# Patient Record
Sex: FEMALE | Race: BLACK OR AFRICAN AMERICAN | NOT HISPANIC OR LATINO | Employment: UNEMPLOYED | URBAN - METROPOLITAN AREA
[De-identification: names, ages, dates, MRNs, and addresses within clinical notes are randomized per-mention and may not be internally consistent; named-entity substitution may affect disease eponyms.]

---

## 2017-04-17 DIAGNOSIS — O09.522 ELDERLY MULTIGRAVIDA IN SECOND TRIMESTER: Primary | ICD-10-CM

## 2017-04-19 ENCOUNTER — OFFICE VISIT (OUTPATIENT)
Dept: MATERNAL FETAL MEDICINE | Facility: CLINIC | Age: 37
End: 2017-04-19
Payer: MEDICAID

## 2017-04-19 VITALS
WEIGHT: 158.94 LBS | SYSTOLIC BLOOD PRESSURE: 112 MMHG | DIASTOLIC BLOOD PRESSURE: 72 MMHG | BODY MASS INDEX: 31.04 KG/M2

## 2017-04-19 DIAGNOSIS — O09.522 ELDERLY MULTIGRAVIDA IN SECOND TRIMESTER: ICD-10-CM

## 2017-04-19 DIAGNOSIS — F11.90: ICD-10-CM

## 2017-04-19 DIAGNOSIS — Z36.89 ENCOUNTER FOR ULTRASOUND TO CHECK FETAL GROWTH: ICD-10-CM

## 2017-04-19 DIAGNOSIS — O99.322: ICD-10-CM

## 2017-04-19 PROCEDURE — 99999 PR PBB SHADOW E&M-EST. PATIENT-LVL II: CPT | Mod: PBBFAC,,, | Performed by: PEDIATRICS

## 2017-04-19 PROCEDURE — 76811 OB US DETAILED SNGL FETUS: CPT | Mod: PBBFAC | Performed by: PEDIATRICS

## 2017-04-19 PROCEDURE — 99204 OFFICE O/P NEW MOD 45 MIN: CPT | Mod: S$PBB,25,TH, | Performed by: PEDIATRICS

## 2017-04-19 PROCEDURE — 76811 OB US DETAILED SNGL FETUS: CPT | Mod: 26,S$PBB,, | Performed by: PEDIATRICS

## 2017-04-19 PROCEDURE — 99212 OFFICE O/P EST SF 10 MIN: CPT | Mod: PBBFAC,25 | Performed by: PEDIATRICS

## 2017-04-19 RX ORDER — ONDANSETRON 4 MG/1
4 TABLET, FILM COATED ORAL EVERY 6 HOURS PRN
COMMUNITY
End: 2017-06-10

## 2017-04-19 NOTE — LETTER
April 21, 2017      Andrea Love MD  [address] [facility] [address]           [facility]  [address] [facility] [address]  Phone: [phone]          Patient: Kelley Ortega   MR Number: [id number]   YOB: 1980   Date of Visit: 4/19/2017       Dear Dr. Andrea Love:    Thank you for referring Kelley Ortega to me for evaluation. Attached you will find relevant portions of my assessment and plan of care.    If you have questions, please do not hesitate to call me. I look forward to following Kelley Ortega along with you.    Sincerely,    Yakelin Martinez MD    Enclosure  CC:  No Recipients    If you would like to receive this communication electronically, please contact [email or web address] or [phone] to request more information on [employer] access.    For providers and/or their staff who would like to refer a patient to [facility], please contact us through our one-stop-shop provider referral line, [facility], at [phone].    If you feel you have received this communication in error or would no longer like to receive these types of communications, please e-mail [email or web address]

## 2017-04-21 PROBLEM — O99.322: Status: ACTIVE | Noted: 2017-04-21

## 2017-04-21 PROBLEM — F11.90: Status: ACTIVE | Noted: 2017-04-21

## 2017-04-21 NOTE — PROGRESS NOTES
"Indication  ========    consult: anatomy/ AMA .    History  ======    General History  Other: Penta Screen Negative, DSR 1: 3702  Previous Outcomes   6  Para 3  Green children born living (T) 3  Green children born (T) 3  Abortions (A) 2  Green living children (L) 3  Terminations 1  Ectopic 1    Maternal Assessment  =================    Weight 72 kg  Weight (lb) 159 lb  BP syst 112 mmHg  BP diast 72 mmHg    Method  ======    Transabdominal ultrasound examination. View: Sufficient.    Pregnancy  =========    Green pregnancy. Number of fetuses: 1.    Dating  ======    GA by "stated dating" 23 w + 5 d  ROSALIO by "stated dating": 2017  Ultrasound examination on: 2017  GA by U/S based upon: AC, BPD, Femur, HC  GA by U/S 24 w + 3 d  ROSALIO by U/S: 2017  Assigned: Dating performed on 2017, based on the external assessment  Assigned GA 23 w + 5 d  Assigned ROSALIO: 2017          General Evaluation  ==============    Cardiac activity: present.  bpm.  Fetal movements: visualized.  Presentation: cephalic .  Placenta: anterior.  Umbilical cord: 3 vessel cord.  Amniotic fluid: normal amount.        Fetal Biometry  ============    Fetal Biometry  BPD 59.1 mm 61% 24w 1d Hadlock  OFD 78.3 mm 95% 25w 4d Linwood  .3 mm 51% 24w 1d Hadlock  .6 mm 59% 24w 2d Hadlock  Femur 45.7 mm 81% 25w 1d Hadlock  Cerebellum tr 27.3 mm 89% 25w 3d Vinson  CM 5.0 mm 24% Nicolaides  Nuchal fold 4.17 mm  Humerus 44.1 mm 97% 26w 1d Linwood   g 79% 24w 2d Hadlock  Calculated by: Hadlock (BPD-HC-AC-FL)  EFW (lb) 1 lb  EFW (oz) 9 oz  Cephalic index 0.75 19% Nicolaides  HC / AC 1.13  FL / BPD 0.77  FL / AC 0.23   bpm  Head / Face / Neck   3.8 mm        Fetal Anatomy  ============    Cranium: normal  Lateral ventricles: normal  Choroid plexus: normal  Midline falx: normal  Cavum septi pellucidi: normal  Cerebellum: normal  Cisterna " magna: normal  Lips: normal  Profile: normal  Nose: normal  RVOT: normal  LVOT: normal  4-chamber view: 4-chamber normal, septum normal  Situs: normal  Aortic arch: normal  Ductal arch: normal  SVC: normal  IVC: normal  3-vessel view: normal  Diaphragm: normal  Cord insertion: normal  Stomach: normal  Kidneys: normal  Bladder: normal  Genitals: normal  Cervical spine: suboptimal  Thoracic spine: suboptimal  Lumbar spine: suboptimal  Sacral spine: suboptimal  Arms: normal  Legs: normal  Gender: female  Wants to know gender: yes  Other: both hands partially open        Maternal Structures  ===============    Uterus / Cervix  Cervical length 36.7 mm        Consultation  ==========    Type: Chief complaint: Advanced maternal age in pregnancy    Provider requesting consultation: Dr. Love    Age based risk for Down Syndrome at this gestational age is approximately 1 in 180.    Aneuploidy screening this pregnancy estimates the risk of Down Syndrome to be +1 in 3702.    A detailed fetal anatomical ultrasound was completed today. See official report in the imaging section of [facility]. Ultrasound noted no obvious  fetal abnormalities. Ultrasound findings consistent with established dating.    After today's ultrasound assessment that noted no evidence for aneuploidy I quoted the patient a Down Syndrome risk of less than 1 in 5852.    Advanced maternal age counseling and recommendations:    Today I counseled the patient on the relationship between maternal age and genetic aneuploidy. We discussed the risks and benefits of  screening tests versus definitive genetic testing (amniocentesis). She was counseled about her specific age related risk of Down Syndrome.  We discussed the limitations of ultrasound in the definitive diagnosis of Down Syndrome and we discussed amniocentesis as providing  definitive diagnosis. I quoted the patient a 1 in 1000 procedure related risk of fetal loss with genetic amniocenetesis. I also discussed with  the  patient the option of non-invasive prenatal testing (NIPT) (ex. Materni T21) including the sensitivity and specificity of the test. Her questions  were answered. As above, she had screen negative Penta Screen and declines further testing.    Recommendations from our MFM group at [facility]:  -For women who are of advanced maternal age at the time of delivery we recommend a follow up fetal ultrasound at 32-34 weeks for interval  fetal growth and well being (biophysical profile).    It should be noted that this very nice patient has had difficulty with pain management from a carol ann placement in her left hip. She has been  obtaining pain meds on the street. This is far from optimal. She should be referred to a pain management service. She may need chronic  Methadone or Subutex.      Impression  =========    Fetal anatomy could not be optimally visualized due to fetal position. No abnormalities are seen.    Biometry is consistent with dating.    AFV is normal.      I spent 30 minutes in direct consultation and care management.      Recommendation  ==============    1. Growth and completion of anatomy in 3 - 4 weeks. Serial growth.  2. Recommend referral to pain management.  3. Initiation of Methadone or Subutex to eliminate street drug purchase.    Thank you for allowing us to participate in the care of your patients. If you have any questions concerning today's consultation feel free to  contact me or one of my partners. We can be reached at [phone] during normal business hours. If you have a question after normal  business hours, please contact Labor and Delivery [phone] and the unit secretary will page our on call physician.

## 2017-05-17 ENCOUNTER — OFFICE VISIT (OUTPATIENT)
Dept: MATERNAL FETAL MEDICINE | Facility: CLINIC | Age: 37
End: 2017-05-17
Payer: MEDICAID

## 2017-05-17 DIAGNOSIS — Z36.89 ENCOUNTER FOR ULTRASOUND TO CHECK FETAL GROWTH: ICD-10-CM

## 2017-05-17 DIAGNOSIS — O09.522 ELDERLY MULTIGRAVIDA IN SECOND TRIMESTER: ICD-10-CM

## 2017-05-17 PROCEDURE — 99499 UNLISTED E&M SERVICE: CPT | Mod: S$PBB,,, | Performed by: PEDIATRICS

## 2017-05-17 PROCEDURE — 76816 OB US FOLLOW-UP PER FETUS: CPT | Mod: PBBFAC | Performed by: PEDIATRICS

## 2017-05-17 PROCEDURE — 76816 OB US FOLLOW-UP PER FETUS: CPT | Mod: 26,S$PBB,, | Performed by: PEDIATRICS

## 2017-05-17 NOTE — PROGRESS NOTES
"Indication  ========    Evaluation of fetal growth.    History  ======    General History  Other: Penta Screen Negative, DSR 1: 3702  Previous Outcomes   6  Para 3  Green children born living (T) 3  Green children born (T) 3  Abortions (A) 2  Green living children (L) 3  Terminations 1  Ectopic 1    Method  ======    Transabdominal ultrasound examination.    Pregnancy  =========    Green pregnancy. Number of fetuses: 1.          Dating  ======    Cycle: regular cycle  GA by "stated dating" 27 w + 5 d  ROSALIO by "stated dating": 2017  Ultrasound examination on: 2017  GA by U/S based upon: AC, BPD, Femur, HC  GA by U/S 29 w + 5 d  ROSALIO by U/S: 2017  Assigned: Dating performed on 2017, based on the external assessment  Assigned GA 27 w + 5 d  Assigned ROSALIO: 2017        General Evaluation  ==============    Cardiac activity: present.  bpm.  Fetal movements: visualized.  Placenta: anterior, fundal.  Umbilical cord: 3 vessel cord.  Amniotic fluid: MVP 7.0 cm.          Fetal Biometry  ============    Fetal Biometry  BPD 73.8 mm 29w 4d Hadlock  OFD 96.4 mm 31w 1d Linwood  .3 mm 29w 6d Hadlock  .7 mm 29w 0d Hadlock  Femur 58.8 mm 30w 5d Hadlock  Cerebellum tr 34.8 mm 30w 4d Vinson  CM 6.6 mm  EFW 1,436 g 96% 29w 2d Hadlock  Calculated by: Hadlock (BPD-HC-AC-FL)  EFW (lb) 3 lb  EFW (oz) 3 oz  Cephalic index 0.77  HC / AC 1.10  FL / BPD 0.80  FL / AC 0.24  MVP 7.0 cm   bpm  Head / Face / Neck   2.1 mm        Fetal Anatomy  ============    Cranium: normal  Lateral ventricles: normal  Choroid plexus: normal  Midline falx: normal  Cavum septi pellucidi: normal  Cerebellum: normal  Cisterna magna: normal  Lips: normal  Profile: normal  Nose: normal  4-chamber view: normal  Cord insertion: documented previously  Stomach: normal  Kidneys: normal  Bladder: normal  Genitals: normal  Cervical spine: normal  Thoracic spine: normal  Lumbar spine: normal  Sacral " spine: normal  Legs: both visualized  Rt arm: documented previously  Lt arm: documented previously  Rt hand: documented previously  Lt hand: visualized  Lt hand: open  Rt leg: documented previously  Lt leg: documented previously  Gender: female  Wants to know gender: yes          Impression  =========    Limited anatomy was negative. No anomalies seen. This completes the anatomic survey.    AFV normal.  Fetal biometry is consistent and concordant with dating.            Recommendation  ==============    Return in 4 - 6 weeks for growth (AMA).    Thank you again for allowing us to participate in the care of your patients. If you have any questions concerning today's consultation, feel free  to contact me or one of my partners. We can be reached at [phone] during normal business hours. If you have a question after normal  business hours, please contact Labor and Delivery at [phone].

## 2017-05-22 ENCOUNTER — TELEPHONE (OUTPATIENT)
Dept: MATERNAL FETAL MEDICINE | Facility: CLINIC | Age: 37
End: 2017-05-22

## 2017-05-22 NOTE — TELEPHONE ENCOUNTER
Copy of ultrasound report faxed to Dr. Love's office accordingly.    ----- Message from Chelsey Varela sent at 5/22/2017  2:27 PM CDT -----  Contact: Michael Girard office  Era's office called looking for the report on pt Kelley Ortega MRN [id number] from her appt on May 17, 2017. Report can be faxed to [phone].

## 2017-06-06 ENCOUNTER — HOSPITAL ENCOUNTER (EMERGENCY)
Facility: OTHER | Age: 37
Discharge: HOME | End: 2017-06-07
Attending: EMERGENCY MEDICINE
Payer: MEDICAID

## 2017-06-06 VITALS
OXYGEN SATURATION: 96 % | TEMPERATURE: 98 F | BODY MASS INDEX: 31.55 KG/M2 | RESPIRATION RATE: 20 BRPM | DIASTOLIC BLOOD PRESSURE: 60 MMHG | WEIGHT: 160.69 LBS | HEIGHT: 60 IN | SYSTOLIC BLOOD PRESSURE: 107 MMHG | HEART RATE: 100 BPM

## 2017-06-06 DIAGNOSIS — H11.31 SUBCONJUNCTIVAL HEMORRHAGE, RIGHT: Primary | ICD-10-CM

## 2017-06-06 LAB
ALBUMIN SERPL BCP-MCNC: 2.9 G/DL
ALP SERPL-CCNC: 76 U/L
ALT SERPL W/O P-5'-P-CCNC: 9 U/L
AMPHET+METHAMPHET UR QL: NEGATIVE
ANION GAP SERPL CALC-SCNC: 7 MMOL/L
AST SERPL-CCNC: 15 U/L
B-HCG UR QL: POSITIVE
BARBITURATES UR QL SCN>200 NG/ML: NEGATIVE
BASOPHILS # BLD AUTO: 0.03 K/UL
BASOPHILS NFR BLD: 0.2 %
BENZODIAZ UR QL SCN>200 NG/ML: NORMAL
BILIRUB SERPL-MCNC: 0.2 MG/DL
BILIRUB UR QL STRIP: NEGATIVE
BUN SERPL-MCNC: 5 MG/DL
BZE UR QL SCN: NEGATIVE
CALCIUM SERPL-MCNC: 8.5 MG/DL
CANNABINOIDS UR QL SCN: NORMAL
CHLORIDE SERPL-SCNC: 105 MMOL/L
CLARITY UR: CLEAR
CO2 SERPL-SCNC: 22 MMOL/L
COLOR UR: YELLOW
CREAT SERPL-MCNC: 0.6 MG/DL
CREAT UR-MCNC: 131.9 MG/DL
CTP QC/QA: YES
DIFFERENTIAL METHOD: ABNORMAL
EOSINOPHIL # BLD AUTO: 0.1 K/UL
EOSINOPHIL NFR BLD: 0.8 %
ERYTHROCYTE [DISTWIDTH] IN BLOOD BY AUTOMATED COUNT: 15.3 %
EST. GFR  (AFRICAN AMERICAN): >60 ML/MIN/1.73 M^2
EST. GFR  (NON AFRICAN AMERICAN): >60 ML/MIN/1.73 M^2
GLUCOSE SERPL-MCNC: 88 MG/DL
GLUCOSE UR QL STRIP: NEGATIVE
HCT VFR BLD AUTO: 25 %
HGB BLD-MCNC: 8.6 G/DL
HGB UR QL STRIP: NEGATIVE
KETONES UR QL STRIP: NEGATIVE
LEUKOCYTE ESTERASE UR QL STRIP: ABNORMAL
LYMPHOCYTES # BLD AUTO: 2.6 K/UL
LYMPHOCYTES NFR BLD: 19.4 %
MAGNESIUM SERPL-MCNC: 1.7 MG/DL
MCH RBC QN AUTO: 26.6 PG
MCHC RBC AUTO-ENTMCNC: 34.4 %
MCV RBC AUTO: 77 FL
METHADONE UR QL SCN>300 NG/ML: NEGATIVE
MICROSCOPIC COMMENT: NORMAL
MONOCYTES # BLD AUTO: 1.2 K/UL
MONOCYTES NFR BLD: 9.4 %
NEUTROPHILS # BLD AUTO: 9.1 K/UL
NEUTROPHILS NFR BLD: 69.4 %
NITRITE UR QL STRIP: NEGATIVE
OPIATES UR QL SCN: NEGATIVE
PCP UR QL SCN>25 NG/ML: NEGATIVE
PH UR STRIP: 6 [PH] (ref 5–8)
PLATELET # BLD AUTO: 239 K/UL
PMV BLD AUTO: 10.2 FL
POTASSIUM SERPL-SCNC: 3.7 MMOL/L
PROT SERPL-MCNC: 6.9 G/DL
PROT UR QL STRIP: NEGATIVE
RBC # BLD AUTO: 3.23 M/UL
SODIUM SERPL-SCNC: 134 MMOL/L
SP GR UR STRIP: 1.01 (ref 1–1.03)
SQUAMOUS #/AREA URNS HPF: 12 /HPF
TOXICOLOGY INFORMATION: NORMAL
URN SPEC COLLECT METH UR: ABNORMAL
UROBILINOGEN UR STRIP-ACNC: NEGATIVE EU/DL
WBC # BLD AUTO: 13.17 K/UL
WBC #/AREA URNS HPF: 3 /HPF (ref 0–5)

## 2017-06-06 PROCEDURE — 81025 URINE PREGNANCY TEST: CPT | Performed by: EMERGENCY MEDICINE

## 2017-06-06 PROCEDURE — 25000003 PHARM REV CODE 250: Performed by: EMERGENCY MEDICINE

## 2017-06-06 PROCEDURE — 85025 COMPLETE CBC W/AUTO DIFF WBC: CPT

## 2017-06-06 PROCEDURE — 81000 URINALYSIS NONAUTO W/SCOPE: CPT

## 2017-06-06 PROCEDURE — 99283 EMERGENCY DEPT VISIT LOW MDM: CPT | Mod: 25

## 2017-06-06 PROCEDURE — 80307 DRUG TEST PRSMV CHEM ANLYZR: CPT

## 2017-06-06 PROCEDURE — 80053 COMPREHEN METABOLIC PANEL: CPT

## 2017-06-06 PROCEDURE — 83735 ASSAY OF MAGNESIUM: CPT

## 2017-06-06 RX ORDER — PROPARACAINE HYDROCHLORIDE 5 MG/ML
1 SOLUTION/ DROPS OPHTHALMIC
Status: COMPLETED | OUTPATIENT
Start: 2017-06-06 | End: 2017-06-06

## 2017-06-06 RX ADMIN — PROPARACAINE HYDROCHLORIDE 1 DROP: 5 SOLUTION/ DROPS OPHTHALMIC at 10:06

## 2017-06-06 RX ADMIN — FLUORESCEIN SODIUM 1 STRIP: 1 STRIP OPHTHALMIC at 10:06

## 2017-06-07 NOTE — ED NOTES
"Pt presented to ED via POV with complaints of right eye redness stating "I noticed it around 2 today and it looks like it a blood vessel burst", on arrival pt presents calm and cooperative RR easy non labored, NAD. Reports being approx 28 weeks pregnant. Negative other complaints at this time, continuous blood pressure and pulse ox applied with tachycardia noted on monitor will all other vitals wnl's, pt denies any current drug or alcohol use, friend at bedside. Denies any blurred vision or seeing spots, AAOx4, side rails up x2, call light within reach, bed in lowest locked position, will continue to monitor for changes   "

## 2017-06-07 NOTE — ED PROVIDER NOTES
Encounter Date: 2017    SCRIBE #1 NOTE: I, Renettadaniel Peña, am scribing for, and in the presence of,  Dr. Fernando I have scribed the entire note.       History     Chief Complaint   Patient presents with    Eye Problem     since 1400 hrs today, denies trauma, R eye, blood vessel burst     Review of patient's allergies indicates:  No Known Allergies  Time seen by provider: 10:01 PM    This is a 36 y.o. female who presents with complaint of mild right eye irritation and redness. The patient states that a blood vessel in her right eye spontaneously burst this afternoon. She admits to slightly blurry vision in the right eye.   Pt is 28 wks gestation, denies any issues and has been following her for prenatal care.  She denies double vision, vaginal bleeding, abdominal cramping, nausea, vomiting, diarrhea, fever, or chills. She denies wearing contacts or glasses.  She denies any issues with EOM and denies any eye drainage.        The history is provided by the patient.     Past Medical History:   Diagnosis Date    Carpal tunnel syndrome      Past Surgical History:   Procedure Laterality Date    BONY PELVIS SURGERY       SECTION, CLASSIC      HIP SURGERY      TUBAL LIGATION       Family History   Problem Relation Age of Onset    Asthma Neg Hx      Social History   Substance Use Topics    Smoking status: Former Smoker     Quit date: 2015    Smokeless tobacco: Not on file    Alcohol use No     Review of Systems   Constitutional: Negative for chills and fever.   HENT: Negative for congestion, nosebleeds and sore throat.    Eyes: Positive for pain, redness and visual disturbance (Mild blurriness in right eye).   Respiratory: Negative for shortness of breath.    Cardiovascular: Negative for chest pain.   Gastrointestinal: Negative for abdominal pain and nausea.   Genitourinary: Negative for difficulty urinating, dysuria and vaginal bleeding.   Musculoskeletal: Negative for back pain and neck pain.   Skin:  Negative for rash.   Neurological: Negative for dizziness, weakness and numbness.   Hematological: Does not bruise/bleed easily.   Psychiatric/Behavioral: Negative for agitation and confusion.       Physical Exam     Initial Vitals [06/06/17 2109]   BP Pulse Resp Temp SpO2   105/69 (!) 143 20 98.3 °F (36.8 °C) 95 %     Physical Exam    Nursing note and vitals reviewed.  Constitutional: She appears well-developed and well-nourished. She is not diaphoretic. No distress.   HENT:   Head: Normocephalic and atraumatic.   Right Ear: External ear normal.   Left Ear: External ear normal.   Eyes: EOM are normal. Pupils are equal, round, and reactive to light. Right eye exhibits no discharge. Left eye exhibits no discharge.   Pupils 2 mm and reactive to light bilaterally.  EOM intact bilaterally with no pain.  R eye:  Subconjunctival hemorrhage in the medial aspect.  Intraocular pressure of 13. No corneal abrasions, ulcers, no increased fluorescein uptake, or evidence of epithelial defects. No periorbital erythema or edema. L eye: Normal.     Neck: Normal range of motion. Neck supple.   Cardiovascular: Regular rhythm, normal heart sounds and intact distal pulses.   Tachycardia   Pulmonary/Chest: Breath sounds normal. No respiratory distress. She has no wheezes. She has no rhonchi. She has no rales.   Abdominal: Soft. Bowel sounds are normal. She exhibits no distension. There is no tenderness. There is no rebound and no guarding.   Gravid abdomen.   Musculoskeletal: Normal range of motion. She exhibits no edema or tenderness.   Neurological: She is alert and oriented to person, place, and time. She has normal strength. No sensory deficit.   Skin: Skin is warm and dry. No rash noted.   Psychiatric: She has a normal mood and affect. Her behavior is normal. Judgment and thought content normal.         ED Course   Procedures  Labs Reviewed   URINALYSIS - Abnormal; Notable for the following:        Result Value    Leukocytes, UA 1+  (*)     All other components within normal limits   CBC W/ AUTO DIFFERENTIAL - Abnormal; Notable for the following:     WBC 13.17 (*)     RBC 3.23 (*)     Hemoglobin 8.6 (*)     Hematocrit 25.0 (*)     MCV 77 (*)     MCH 26.6 (*)     RDW 15.3 (*)     Gran # 9.1 (*)     Mono # 1.2 (*)     All other components within normal limits   COMPREHENSIVE METABOLIC PANEL - Abnormal; Notable for the following:     Sodium 134 (*)     CO2 22 (*)     BUN, Bld 5 (*)     Calcium 8.5 (*)     Albumin 2.9 (*)     ALT 9 (*)     Anion Gap 7 (*)     All other components within normal limits   POCT URINE PREGNANCY - Abnormal; Notable for the following:     POC Preg Test, Ur Positive (*)     All other components within normal limits   DRUG SCREEN PANEL, URINE EMERGENCY   MAGNESIUM   URINALYSIS MICROSCOPIC             Medical Decision Making:   History:   Old Medical Records: I decided to obtain old medical records.  Old Records Summarized: records from clinic visits and other records.  Initial Assessment:   10:01PM:  Pt is a 35 y/o F who presents to ED with R eye redness, appears to be a subconjunctival hemorrhage. Will plan for more extensive eye exam. She is also very tachycardic in the 140s, no clear etiology and pt denies any symptoms. Will plan for IVFs, UA, labs, will continue to follow and reassess.    Clinical Tests:   Lab Tests: Ordered and Reviewed    11:06 PM:  Pt doing well.  Her eye exam is benign with no concerning findings.  Will plan for supportive care measures for a subconjunctival hemorrhage.  Her HR did come down to 100 with just 1 L of IVFs.  Her UDS was positive for benzos and marijuana which could be contributing to her elevated HR.  Her labs are otherwise WNL though she is slightly anemic.  I updated pt regarding results including need to f/u with her OB regarding her anemia.   She had good FHTs.  I counseled pt regarding supportive care measures.  I have discussed with the pt ED return warnings and need for close  PCP f/u.  Pt agreeable to plan and all questions answered.  I feel that pt is stable for discharge and management as an outpatient and no further intervention is needed at this time.  Pt is comfortable returning to the ED if needed.  Will DC home in stable condition.                Scribe Attestation:   Scribe #1: I performed the above scribed service and the documentation accurately describes the services I performed. I attest to the accuracy of the note.    Attending Attestation:           Physician Attestation for Scribe:  Physician Attestation Statement for Scribe #1: I, Dr. Sandoval, reviewed documentation, as scribed by Renetta Peña in my presence, and it is both accurate and complete.                 ED Course     Clinical Impression:     1. Subconjunctival hemorrhage, right                Neisha Sandoval MD  06/06/17 5513

## 2017-06-10 ENCOUNTER — HOSPITAL ENCOUNTER (EMERGENCY)
Facility: OTHER | Age: 37
Discharge: HOME | End: 2017-06-10
Payer: MEDICAID

## 2017-06-10 VITALS
HEART RATE: 107 BPM | TEMPERATURE: 98 F | OXYGEN SATURATION: 99 % | SYSTOLIC BLOOD PRESSURE: 123 MMHG | RESPIRATION RATE: 20 BRPM | DIASTOLIC BLOOD PRESSURE: 64 MMHG

## 2017-06-10 LAB
AMPHET+METHAMPHET UR QL: NEGATIVE
BARBITURATES UR QL SCN>200 NG/ML: NEGATIVE
BENZODIAZ UR QL SCN>200 NG/ML: NORMAL
BILIRUB SERPL-MCNC: NEGATIVE MG/DL
BLOOD URINE, POC: NEGATIVE
BZE UR QL SCN: NEGATIVE
CANDIDA RRNA VAG QL PROBE: POSITIVE
CANNABINOIDS UR QL SCN: NORMAL
COLOR, POC UA: NORMAL
CREAT UR-MCNC: 105.1 MG/DL
ETHANOL UR-MCNC: <10 MG/DL
G VAGINALIS RRNA GENITAL QL PROBE: POSITIVE
GLUCOSE UR QL STRIP: NEGATIVE
KETONES UR QL STRIP: NEGATIVE
LEUKOCYTE ESTERASE URINE, POC: 1
METHADONE UR QL SCN>300 NG/ML: NEGATIVE
NITRITE, POC UA: NEGATIVE
OPIATES UR QL SCN: NEGATIVE
PCP UR QL SCN>25 NG/ML: NEGATIVE
PH, POC UA: NORMAL
PROTEIN, POC: NEGATIVE
SPECIFIC GRAVITY, POC UA: NORMAL
T VAGINALIS RRNA GENITAL QL PROBE: NEGATIVE
TOXICOLOGY INFORMATION: NORMAL
UROBILINOGEN, POC UA: NEGATIVE

## 2017-06-10 PROCEDURE — 87086 URINE CULTURE/COLONY COUNT: CPT

## 2017-06-10 PROCEDURE — 80307 DRUG TEST PRSMV CHEM ANLYZR: CPT

## 2017-06-10 PROCEDURE — 81002 URINALYSIS NONAUTO W/O SCOPE: CPT

## 2017-06-10 PROCEDURE — 99284 EMERGENCY DEPT VISIT MOD MDM: CPT | Mod: 25

## 2017-06-10 PROCEDURE — 25000003 PHARM REV CODE 250: Performed by: OBSTETRICS & GYNECOLOGY

## 2017-06-10 PROCEDURE — 87480 CANDIDA DNA DIR PROBE: CPT

## 2017-06-10 PROCEDURE — 87591 N.GONORRHOEAE DNA AMP PROB: CPT

## 2017-06-10 RX ORDER — ONDANSETRON 4 MG/1
4 TABLET, FILM COATED ORAL EVERY 6 HOURS PRN
Qty: 30 TABLET | Refills: 0 | Status: ON HOLD | OUTPATIENT
Start: 2017-06-10 | End: 2017-08-08 | Stop reason: HOSPADM

## 2017-06-10 RX ORDER — OXYCODONE HYDROCHLORIDE 5 MG/1
10 TABLET ORAL ONCE
Status: COMPLETED | OUTPATIENT
Start: 2017-06-10 | End: 2017-06-10

## 2017-06-10 RX ORDER — ACETAMINOPHEN 500 MG
1000 TABLET ORAL ONCE
Status: COMPLETED | OUTPATIENT
Start: 2017-06-10 | End: 2017-06-10

## 2017-06-10 RX ADMIN — ACETAMINOPHEN 1000 MG: 500 TABLET ORAL at 05:06

## 2017-06-10 RX ADMIN — OXYCODONE HYDROCHLORIDE 10 MG: 5 TABLET ORAL at 05:06

## 2017-06-10 NOTE — ED NOTES
Pt discharged home @ 1831. AVS reviewed with patient. Discharge instructions and education given. Pt verbalised understanding.
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
## 2017-06-10 NOTE — DISCHARGE INSTRUCTIONS
Call clinic [phone] or L & D after hours at [phone] for vaginal bleeding, leakage of fluids, regular contractions every 5 mins for 2 hours, decreased fetal movements ( 10 kicks in 2 hours), headache not relieved by Tylenol, blurry vision, or temp of 100.4 or greater.  Begin doing fetal kick counts, at least 10 movements in 2 hours starting at 28 weeks gestation.  Keep next clinic appointment

## 2017-06-11 LAB
C TRACH DNA SPEC QL NAA+PROBE: NOT DETECTED
N GONORRHOEA DNA SPEC QL NAA+PROBE: NOT DETECTED

## 2017-06-12 LAB — BACTERIA UR CULT: NORMAL

## 2017-06-13 ENCOUNTER — TELEPHONE (OUTPATIENT)
Dept: OBSTETRICS AND GYNECOLOGY | Facility: CLINIC | Age: 37
End: 2017-06-13

## 2017-06-13 RX ORDER — METRONIDAZOLE 7.5 MG/G
1 GEL VAGINAL DAILY
Qty: 7 APPLICATOR | Refills: 0 | Status: SHIPPED | OUTPATIENT
Start: 2017-06-13 | End: 2017-06-20

## 2017-06-13 RX ORDER — FLUCONAZOLE 150 MG/1
150 TABLET ORAL ONCE
Qty: 2 TABLET | Refills: 1 | Status: SHIPPED | OUTPATIENT
Start: 2017-06-13 | End: 2017-06-13

## 2017-06-13 NOTE — TELEPHONE ENCOUNTER
Contacted patient. She was seen in the ED this weekend. Affirm positive for BV and yeast. Discussed dx and treatment plan.   Patient confirms that she has been taking her Zofran prior to taking her Macrobid and is now able to keep down Abx for UTI.   Has appt with MFM tomorrow. Encouraged patient to discuss opiate use with MFM provider at tomorrow's visit.

## 2017-06-14 ENCOUNTER — SOCIAL WORK (OUTPATIENT)
Dept: CASE MANAGEMENT | Facility: OTHER | Age: 37
End: 2017-06-14
Payer: MEDICAID

## 2017-06-14 ENCOUNTER — OFFICE VISIT (OUTPATIENT)
Dept: MATERNAL FETAL MEDICINE | Facility: CLINIC | Age: 37
End: 2017-06-14
Payer: MEDICAID

## 2017-06-14 DIAGNOSIS — O09.523 ELDERLY MULTIGRAVIDA IN THIRD TRIMESTER: ICD-10-CM

## 2017-06-14 DIAGNOSIS — Z36.89 ENCOUNTER FOR ULTRASOUND TO CHECK FETAL GROWTH: ICD-10-CM

## 2017-06-14 PROCEDURE — 99499 UNLISTED E&M SERVICE: CPT | Mod: S$PBB,,, | Performed by: OBSTETRICS & GYNECOLOGY

## 2017-06-14 PROCEDURE — 76816 OB US FOLLOW-UP PER FETUS: CPT | Mod: PBBFAC | Performed by: OBSTETRICS & GYNECOLOGY

## 2017-06-14 PROCEDURE — 76816 OB US FOLLOW-UP PER FETUS: CPT | Mod: 26,S$PBB,, | Performed by: OBSTETRICS & GYNECOLOGY

## 2017-06-14 NOTE — LETTER
June 14, 2017      Yakelin Martinez MD  [address]  [facility] [address]           [facility]  [address]  [facility] [address]  Phone: [phone]          Patient: Kelley Ortega   MR Number: [id number]   YOB: 1980   Date of Visit: 6/14/2017       Dear Dr. Yakelin Martinez:    Thank you for referring Kelley Ortega to me for evaluation. Attached you will find relevant portions of my assessment and plan of care.    If you have questions, please do not hesitate to call me. I look forward to following Kelley Ortega along with you.    Sincerely,    Meaghan Browne MD    Enclosure  CC:  No Recipients    If you would like to receive this communication electronically, please contact [email or web address] or [phone] to request more information on [employer] Link access.    For providers and/or their staff who would like to refer a patient to [facility], please contact us through our one-stop-shop provider referral line, [facility], at [phone].    If you feel you have received this communication in error or would no longer like to receive these types of communications, please e-mail [email or web address]

## 2017-06-14 NOTE — PROGRESS NOTES
"OB Ultrasound:    Indication  ========    Follow-up evaluation for fetal growth; AMA.    History  ======    General History  Other: Penta Screen Negative, DSR 1: 3702  Previous Outcomes   6  Para 3  Green children born living (T) 3  Green children born (T) 3  Abortions (A) 2  Green living children (L) 3  Terminations 1  Ectopic 1    Method  ======    Transabdominal ultrasound examination. View: Sufficient.    Pregnancy  =========    Green pregnancy. Number of fetuses: 1.    Dating  ======    Cycle: regular cycle  GA by "stated dating" 31 w + 5 d  ROSALIO by "stated dating": 2017  Ultrasound examination on: 2017  GA by U/S based upon: AC, BPD, Femur, HC  GA by U/S 32 w + 5 d  ROSALIO by U/S: 2017  Assigned: Dating performed on 2017, based on the external assessment  Assigned GA 31 w + 5 d  Assigned ROSALIO: 2017    General Evaluation  ===============    Cardiac activity: present.  bpm.  Fetal movements: visualized.  Presentation: cephalic.  Placenta: anterior.  Umbilical cord: 3 vessel cord.  Amniotic fluid: MVP 7.8 cm.    Fetal Biometry  ===========    Fetal Biometry  BPD 80.9 mm 32w 3d Hadlock  .7 mm 34w 2d Linwood  .3 mm 32w 6d Hadlock  .1 mm 33w 0d Hadlock  Femur 63.2 mm 32w 5d Hadlock  EFW 2,066 g 46% Bossman  Calculated by: Hadlock (BPD-HC-AC-FL)  EFW (lb) 4 lb  EFW (oz) 9 oz  Cephalic index 0.77  HC / AC 1.02  FL / BPD 0.78  FL / AC 0.22  MVP 7.8 cm   bpm    Fetal Anatomy  ===========    Cranium: normal  Stomach: normal  Kidneys: normal  Bladder: normal  Gender: female  Wants to know gender: yes  Other: A full anatomic survey has been previously performed.    Impression  =========    Fetal size is AGA with the EFW at the 46th percentile.  Normal repeat limited fetal anatomic survey. AFV is normal. Follow-up ultrasound as clinically indicated.  The patient was seen by Social Work today regarding her chronic oral opiate use and stated desire " to discontinue use of these  substances. She was provided with information regarding services available to assist in withdrawal from use of these medications.

## 2017-06-14 NOTE — NURSING
"Pt presents to [facility] clinic for follow-up growth ultrasound. Pt requesting for assistance with her "addiction to pain medicine." Dr. Browne spoke with patient and Ashley () contacted and came to clinic for consult with patient.      "

## 2017-06-15 ENCOUNTER — TELEPHONE (OUTPATIENT)
Dept: OBSTETRICS AND GYNECOLOGY | Facility: CLINIC | Age: 37
End: 2017-06-15

## 2017-06-15 RX ORDER — METRONIDAZOLE 500 MG/1
500 TABLET ORAL 2 TIMES DAILY
Qty: 14 TABLET | Refills: 0 | Status: SHIPPED | OUTPATIENT
Start: 2017-06-15 | End: 2017-06-22

## 2017-06-15 NOTE — TELEPHONE ENCOUNTER
Pt was calling you back to speak with you. She said she is having problems and her insurance won't cover the rx that was sent in so she would like to speak with you and see if there is something else she can do.

## 2017-06-15 NOTE — TELEPHONE ENCOUNTER
Spoke to pt regarding rx, I told the pt that her rx was sent to the pharmacy. Also told her to take the nausea medication 20 mins before taking the flagyl. Pt understood. She also c/o some sharp abdominal pain. She ststaed that if it continued that she would go to the ER. I told her to keep us posted.

## 2017-06-16 NOTE — PROGRESS NOTES
"Met with pt after called by the [facility] clinic for pt admitting addiction to pain pills and wanting resources to help stop.    Pt has been using percocet and soma throughout pregnancy. Pt tried to stop using pain pills but could not tolerate the withdrawal. Pt reported she discussed with her primary OBGYN and she was told to "just stop." Pt family members tell her the same thing. Pt is having a hard time continuing to get the pain pills daily to stay out of withdrawal. Pt educated on methadone, subutex or suboxone being the opiate replacement therapy rec'd for pregnant women. Pt reported she cannot afford those meds. Pt educated on the [facility] clinic(contact info and address provided) and the reduction in cost for pregnant women. Pt encouraged to reach out to family members bc she would need financial support since she is not working. Pt also educated on mandated reporting for  drug exposure once baby is born. Pt was very tearful throughout visit. Emotional support was provided. Pt strongly encouraged to show up at [facility] tomorrow morning in between 5 am -8am for intake. Pt educated on medicaid transportation since she does not have consistent transportation.     Pt asked for [facility] MD to rx at least one day of pain pills to avoid withdrawal. MD was not comfortable with this plan. Sw is in agreement with MD. If pt goes for intake tomorrow morning, she will be given opiate replacement meds that morning which will stop or prevent withdrawal. Pt is aware of this and verbalized understanding.    Ashley Painter LCSW    [facility]  [email or web address]    (phone) [phone] or  Txq. [phone]  (fax) [phone]    "

## 2017-07-19 ENCOUNTER — HOSPITAL ENCOUNTER (OUTPATIENT)
Facility: HOSPITAL | Age: 37
Discharge: HOME | End: 2017-07-19
Attending: OBSTETRICS & GYNECOLOGY | Admitting: OBSTETRICS & GYNECOLOGY
Payer: MEDICAID

## 2017-07-19 VITALS
SYSTOLIC BLOOD PRESSURE: 114 MMHG | BODY MASS INDEX: 31.8 KG/M2 | RESPIRATION RATE: 18 BRPM | DIASTOLIC BLOOD PRESSURE: 71 MMHG | TEMPERATURE: 98 F | HEART RATE: 80 BPM | HEIGHT: 60 IN | WEIGHT: 162 LBS

## 2017-07-19 DIAGNOSIS — Z34.90 PREGNANCY WITH ONE FETUS: ICD-10-CM

## 2017-07-19 PROCEDURE — 25000003 PHARM REV CODE 250: Performed by: OBSTETRICS & GYNECOLOGY

## 2017-07-19 PROCEDURE — 99211 OFF/OP EST MAY X REQ PHY/QHP: CPT

## 2017-07-19 PROCEDURE — 96360 HYDRATION IV INFUSION INIT: CPT

## 2017-07-19 RX ORDER — SODIUM CHLORIDE, SODIUM LACTATE, POTASSIUM CHLORIDE, CALCIUM CHLORIDE 600; 310; 30; 20 MG/100ML; MG/100ML; MG/100ML; MG/100ML
INJECTION, SOLUTION INTRAVENOUS CONTINUOUS
Status: DISCONTINUED | OUTPATIENT
Start: 2017-07-19 | End: 2017-07-19 | Stop reason: HOSPADM

## 2017-07-19 RX ADMIN — SODIUM CHLORIDE, SODIUM LACTATE, POTASSIUM CHLORIDE, AND CALCIUM CHLORIDE 1000 ML: .6; .31; .03; .02 INJECTION, SOLUTION INTRAVENOUS at 12:07
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
## 2017-07-19 NOTE — PROGRESS NOTES
Discharge instructions reviewed with pt, included kick counts & labor precautions. Pt verbalized understanding. Ambulated off unit with guards..

## 2017-07-19 NOTE — DISCHARGE INSTRUCTIONS
Home Undelivered Discharge Instructions    After Discharge Orders:    No future appointments.        Current Discharge Medication List      CONTINUE these medications which have NOT CHANGED    Details   ondansetron (ZOFRAN) 4 MG tablet Take 1 tablet (4 mg total) by mouth every 6 (six) hours as needed for Nausea.  Qty: 30 tablet, Refills: 0      PRENATAL VIT CALC,IRON,FOLIC (PRENATAL VITAMIN ORAL) Take 1 tablet by mouth Daily.                     · Diet:  normal diet as tolerated    · Rest: normal activity as tolerated    Other instructions: Do kick counts once a day on your baby. Choose the time of day your baby is most active. Get in a comfortable lying or sitting position and time how long it takes to feel 10 kicks, twists, turns, swishes, or rolls. Call your physician or midwife if there have not been 10 kicks in 1.5 hours    Call physician or midwife, return to Labor and Delivery, call 911, or go to the nearest Emergency Room if: decreased fetal movement, persistent low back pain or cramping, bleeding from vaginal area, difficulty urinating or pain with urination     DRINK 8-10 BOTTLES OF WATER A DAY.  FOLLOW UP IN  OFFICE WITH DR BRADSHAW.

## 2017-07-19 NOTE — PROGRESS NOTES
Report to Dr Love, received order to discharge with instructions to follow up in office after she gets out of [facility].

## 2017-08-06 ENCOUNTER — ANESTHESIA (OUTPATIENT)
Dept: OBSTETRICS AND GYNECOLOGY | Facility: HOSPITAL | Age: 37
End: 2017-08-06
Payer: MEDICAID

## 2017-08-06 ENCOUNTER — ANESTHESIA EVENT (OUTPATIENT)
Dept: OBSTETRICS AND GYNECOLOGY | Facility: HOSPITAL | Age: 37
End: 2017-08-06
Payer: MEDICAID

## 2017-08-06 ENCOUNTER — HOSPITAL ENCOUNTER (INPATIENT)
Facility: HOSPITAL | Age: 37
LOS: 2 days | Discharge: HOME | End: 2017-08-08
Attending: OBSTETRICS & GYNECOLOGY | Admitting: OBSTETRICS & GYNECOLOGY
Payer: MEDICAID

## 2017-08-06 DIAGNOSIS — G89.18 POST-OP PAIN: Primary | ICD-10-CM

## 2017-08-06 DIAGNOSIS — Z34.90 PREGNANT: ICD-10-CM

## 2017-08-06 PROBLEM — O09.522 ELDERLY MULTIGRAVIDA IN SECOND TRIMESTER: Status: RESOLVED | Noted: 2017-05-17 | Resolved: 2017-08-06

## 2017-08-06 PROBLEM — O34.219 DECLINES VAGINAL BIRTH AFTER CESAREAN TRIAL: Status: RESOLVED | Noted: 2017-08-06 | Resolved: 2017-08-06

## 2017-08-06 PROBLEM — O34.219 DECLINES VAGINAL BIRTH AFTER CESAREAN TRIAL: Status: ACTIVE | Noted: 2017-08-06

## 2017-08-06 LAB
ABO + RH BLD: NORMAL
AMPHET+METHAMPHET UR QL: NEGATIVE
BARBITURATES UR QL SCN>200 NG/ML: NEGATIVE
BASOPHILS # BLD AUTO: 0.03 K/UL
BASOPHILS NFR BLD: 0.3 %
BENZODIAZ UR QL SCN>200 NG/ML: NEGATIVE
BLD GP AB SCN CELLS X3 SERPL QL: NORMAL
BZE UR QL SCN: NEGATIVE
CANNABINOIDS UR QL SCN: NEGATIVE
CREAT UR-MCNC: 45.5 MG/DL
DIFFERENTIAL METHOD: ABNORMAL
EOSINOPHIL # BLD AUTO: 0.1 K/UL
EOSINOPHIL NFR BLD: 0.5 %
ERYTHROCYTE [DISTWIDTH] IN BLOOD BY AUTOMATED COUNT: 16.8 %
HCT VFR BLD AUTO: 28.5 %
HGB BLD-MCNC: 9.3 G/DL
HIV1+2 IGG SERPL QL IA.RAPID: NEGATIVE
LYMPHOCYTES # BLD AUTO: 2.1 K/UL
LYMPHOCYTES NFR BLD: 17.9 %
MCH RBC QN AUTO: 23.7 PG
MCHC RBC AUTO-ENTMCNC: 32.6 G/DL
MCV RBC AUTO: 73 FL
METHADONE UR QL SCN>300 NG/ML: NEGATIVE
MONOCYTES # BLD AUTO: 1.3 K/UL
MONOCYTES NFR BLD: 11.1 %
NEUTROPHILS # BLD AUTO: 8.2 K/UL
NEUTROPHILS NFR BLD: 70.2 %
OPIATES UR QL SCN: NEGATIVE
PCP UR QL SCN>25 NG/ML: NEGATIVE
PLATELET # BLD AUTO: 205 K/UL
PMV BLD AUTO: 11.6 FL
RBC # BLD AUTO: 3.92 M/UL
TOXICOLOGY INFORMATION: NORMAL
WBC # BLD AUTO: 11.79 K/UL

## 2017-08-06 PROCEDURE — 86900 BLOOD TYPING SEROLOGIC ABO: CPT

## 2017-08-06 PROCEDURE — 11000001 HC ACUTE MED/SURG PRIVATE ROOM

## 2017-08-06 PROCEDURE — 25000003 PHARM REV CODE 250: Performed by: OBSTETRICS & GYNECOLOGY

## 2017-08-06 PROCEDURE — 36415 COLL VENOUS BLD VENIPUNCTURE: CPT

## 2017-08-06 PROCEDURE — 51702 INSERT TEMP BLADDER CATH: CPT

## 2017-08-06 PROCEDURE — 80307 DRUG TEST PRSMV CHEM ANLYZR: CPT

## 2017-08-06 PROCEDURE — 87340 HEPATITIS B SURFACE AG IA: CPT

## 2017-08-06 PROCEDURE — 63600175 PHARM REV CODE 636 W HCPCS: Performed by: OBSTETRICS & GYNECOLOGY

## 2017-08-06 PROCEDURE — 88302 TISSUE EXAM BY PATHOLOGIST: CPT | Performed by: PATHOLOGY

## 2017-08-06 PROCEDURE — 27200688 HC TRAY, SPINAL-HYPER/ ISOBARIC: Performed by: ANESTHESIOLOGY

## 2017-08-06 PROCEDURE — 37000008 HC ANESTHESIA 1ST 15 MINUTES: Performed by: OBSTETRICS & GYNECOLOGY

## 2017-08-06 PROCEDURE — 99211 OFF/OP EST MAY X REQ PHY/QHP: CPT

## 2017-08-06 PROCEDURE — 37000009 HC ANESTHESIA EA ADD 15 MINS: Performed by: OBSTETRICS & GYNECOLOGY

## 2017-08-06 PROCEDURE — 86762 RUBELLA ANTIBODY: CPT

## 2017-08-06 PROCEDURE — 86850 RBC ANTIBODY SCREEN: CPT

## 2017-08-06 PROCEDURE — 25000003 PHARM REV CODE 250: Performed by: NURSE ANESTHETIST, CERTIFIED REGISTERED

## 2017-08-06 PROCEDURE — 59514 CESAREAN DELIVERY ONLY: CPT | Mod: CRNA,,, | Performed by: NURSE ANESTHETIST, CERTIFIED REGISTERED

## 2017-08-06 PROCEDURE — 27100025 HC TUBING, SET FLUID WARMER: Performed by: NURSE ANESTHETIST, CERTIFIED REGISTERED

## 2017-08-06 PROCEDURE — 88302 TISSUE EXAM BY PATHOLOGIST: CPT | Mod: 26,,, | Performed by: PATHOLOGY

## 2017-08-06 PROCEDURE — 36000685 HC CESAREAN SECTION LEVEL I

## 2017-08-06 PROCEDURE — 86592 SYPHILIS TEST NON-TREP QUAL: CPT

## 2017-08-06 PROCEDURE — 86703 HIV-1/HIV-2 1 RESULT ANTBDY: CPT

## 2017-08-06 PROCEDURE — 63600175 PHARM REV CODE 636 W HCPCS: Performed by: ANESTHESIOLOGY

## 2017-08-06 PROCEDURE — 85025 COMPLETE CBC W/AUTO DIFF WBC: CPT

## 2017-08-06 PROCEDURE — 36000680 HC C/S TUBAL LIGATION LEVEL I

## 2017-08-06 PROCEDURE — 63600175 PHARM REV CODE 636 W HCPCS: Performed by: NURSE ANESTHETIST, CERTIFIED REGISTERED

## 2017-08-06 PROCEDURE — 0UB70ZZ EXCISION OF BILATERAL FALLOPIAN TUBES, OPEN APPROACH: ICD-10-PCS | Performed by: OBSTETRICS & GYNECOLOGY

## 2017-08-06 PROCEDURE — S0028 INJECTION, FAMOTIDINE, 20 MG: HCPCS | Performed by: OBSTETRICS & GYNECOLOGY

## 2017-08-06 PROCEDURE — 59514 CESAREAN DELIVERY ONLY: CPT | Mod: ANES,,, | Performed by: ANESTHESIOLOGY

## 2017-08-06 RX ORDER — BUPIVACAINE HYDROCHLORIDE 7.5 MG/ML
INJECTION, SOLUTION INTRASPINAL
Status: DISCONTINUED | OUTPATIENT
Start: 2017-08-06 | End: 2017-08-06

## 2017-08-06 RX ORDER — METOCLOPRAMIDE 10 MG/1
10 TABLET ORAL EVERY 6 HOURS PRN
Status: DISCONTINUED | OUTPATIENT
Start: 2017-08-06 | End: 2017-08-06

## 2017-08-06 RX ORDER — ONDANSETRON 8 MG/1
8 TABLET, ORALLY DISINTEGRATING ORAL EVERY 8 HOURS PRN
Status: DISCONTINUED | OUTPATIENT
Start: 2017-08-06 | End: 2017-08-08 | Stop reason: HOSPADM

## 2017-08-06 RX ORDER — SODIUM CITRATE AND CITRIC ACID MONOHYDRATE 334; 500 MG/5ML; MG/5ML
30 SOLUTION ORAL
Status: DISCONTINUED | OUTPATIENT
Start: 2017-08-06 | End: 2017-08-06

## 2017-08-06 RX ORDER — SODIUM CITRATE AND CITRIC ACID MONOHYDRATE 334; 500 MG/5ML; MG/5ML
30 SOLUTION ORAL ONCE
Status: DISCONTINUED | OUTPATIENT
Start: 2017-08-06 | End: 2017-08-06

## 2017-08-06 RX ORDER — FENTANYL CITRATE 50 UG/ML
INJECTION, SOLUTION INTRAMUSCULAR; INTRAVENOUS
Status: DISCONTINUED | OUTPATIENT
Start: 2017-08-06 | End: 2017-08-06

## 2017-08-06 RX ORDER — DIPHENHYDRAMINE HYDROCHLORIDE 50 MG/ML
12.5 INJECTION INTRAMUSCULAR; INTRAVENOUS EVERY 4 HOURS PRN
Status: DISCONTINUED | OUTPATIENT
Start: 2017-08-06 | End: 2017-08-06

## 2017-08-06 RX ORDER — NALOXONE HCL 0.4 MG/ML
0.02 VIAL (ML) INJECTION
Status: DISCONTINUED | OUTPATIENT
Start: 2017-08-06 | End: 2017-08-06

## 2017-08-06 RX ORDER — DIPHENHYDRAMINE HCL 25 MG
25 CAPSULE ORAL EVERY 4 HOURS PRN
Status: DISCONTINUED | OUTPATIENT
Start: 2017-08-06 | End: 2017-08-08 | Stop reason: HOSPADM

## 2017-08-06 RX ORDER — METOCLOPRAMIDE HYDROCHLORIDE 5 MG/ML
10 INJECTION INTRAMUSCULAR; INTRAVENOUS ONCE
Status: COMPLETED | OUTPATIENT
Start: 2017-08-06 | End: 2017-08-06

## 2017-08-06 RX ORDER — PHENYLEPHRINE HYDROCHLORIDE 10 MG/ML
INJECTION INTRAVENOUS
Status: DISCONTINUED | OUTPATIENT
Start: 2017-08-06 | End: 2017-08-06

## 2017-08-06 RX ORDER — DOCUSATE SODIUM 100 MG/1
200 CAPSULE, LIQUID FILLED ORAL 2 TIMES DAILY
Status: DISCONTINUED | OUTPATIENT
Start: 2017-08-06 | End: 2017-08-08 | Stop reason: HOSPADM

## 2017-08-06 RX ORDER — ADHESIVE BANDAGE
30 BANDAGE TOPICAL 2 TIMES DAILY PRN
Status: DISCONTINUED | OUTPATIENT
Start: 2017-08-07 | End: 2017-08-08 | Stop reason: HOSPADM

## 2017-08-06 RX ORDER — IBUPROFEN 600 MG/1
600 TABLET ORAL EVERY 6 HOURS
Status: DISCONTINUED | OUTPATIENT
Start: 2017-08-07 | End: 2017-08-08 | Stop reason: HOSPADM

## 2017-08-06 RX ORDER — OXYCODONE AND ACETAMINOPHEN 5; 325 MG/1; MG/1
1 TABLET ORAL EVERY 4 HOURS PRN
Status: DISCONTINUED | OUTPATIENT
Start: 2017-08-06 | End: 2017-08-08 | Stop reason: HOSPADM

## 2017-08-06 RX ORDER — SODIUM CHLORIDE, SODIUM LACTATE, POTASSIUM CHLORIDE, CALCIUM CHLORIDE 600; 310; 30; 20 MG/100ML; MG/100ML; MG/100ML; MG/100ML
INJECTION, SOLUTION INTRAVENOUS CONTINUOUS
Status: ACTIVE | OUTPATIENT
Start: 2017-08-06 | End: 2017-08-07

## 2017-08-06 RX ORDER — ACETAMINOPHEN 10 MG/ML
1000 INJECTION, SOLUTION INTRAVENOUS EVERY 8 HOURS
Status: COMPLETED | OUTPATIENT
Start: 2017-08-06 | End: 2017-08-07

## 2017-08-06 RX ORDER — SIMETHICONE 80 MG
1 TABLET,CHEWABLE ORAL EVERY 6 HOURS PRN
Status: DISCONTINUED | OUTPATIENT
Start: 2017-08-06 | End: 2017-08-08 | Stop reason: HOSPADM

## 2017-08-06 RX ORDER — NALOXONE HCL 0.4 MG/ML
0.02 VIAL (ML) INJECTION
Status: DISCONTINUED | OUTPATIENT
Start: 2017-08-06 | End: 2017-08-07

## 2017-08-06 RX ORDER — MISOPROSTOL 200 UG/1
800 TABLET ORAL
Status: DISCONTINUED | OUTPATIENT
Start: 2017-08-06 | End: 2017-08-06

## 2017-08-06 RX ORDER — CEFAZOLIN SODIUM 2 G/50ML
2 SOLUTION INTRAVENOUS ONCE
Status: COMPLETED | OUTPATIENT
Start: 2017-08-06 | End: 2017-08-06

## 2017-08-06 RX ORDER — BISACODYL 10 MG
10 SUPPOSITORY, RECTAL RECTAL ONCE AS NEEDED
Status: COMPLETED | OUTPATIENT
Start: 2017-08-07 | End: 2017-08-07

## 2017-08-06 RX ORDER — HYDROCORTISONE 25 MG/G
CREAM TOPICAL 3 TIMES DAILY PRN
Status: DISCONTINUED | OUTPATIENT
Start: 2017-08-06 | End: 2017-08-08 | Stop reason: HOSPADM

## 2017-08-06 RX ORDER — HYDROMORPHONE HCL IN 0.9% NACL 6 MG/30 ML
PATIENT CONTROLLED ANALGESIA SYRINGE INTRAVENOUS CONTINUOUS
Status: DISCONTINUED | OUTPATIENT
Start: 2017-08-06 | End: 2017-08-06

## 2017-08-06 RX ORDER — AMOXICILLIN 250 MG
1 CAPSULE ORAL NIGHTLY PRN
Status: DISCONTINUED | OUTPATIENT
Start: 2017-08-06 | End: 2017-08-08 | Stop reason: HOSPADM

## 2017-08-06 RX ORDER — IBUPROFEN 600 MG/1
600 TABLET ORAL EVERY 6 HOURS
Status: DISCONTINUED | OUTPATIENT
Start: 2017-08-06 | End: 2017-08-06

## 2017-08-06 RX ORDER — OXYTOCIN/RINGER'S LACTATE 20/1000 ML
41.65 PLASTIC BAG, INJECTION (ML) INTRAVENOUS CONTINUOUS
Status: DISPENSED | OUTPATIENT
Start: 2017-08-06 | End: 2017-08-06

## 2017-08-06 RX ORDER — FAMOTIDINE 10 MG/ML
20 INJECTION INTRAVENOUS
Status: DISCONTINUED | OUTPATIENT
Start: 2017-08-06 | End: 2017-08-06

## 2017-08-06 RX ORDER — OXYCODONE AND ACETAMINOPHEN 10; 325 MG/1; MG/1
1 TABLET ORAL EVERY 4 HOURS PRN
Status: DISCONTINUED | OUTPATIENT
Start: 2017-08-06 | End: 2017-08-08 | Stop reason: HOSPADM

## 2017-08-06 RX ORDER — ONDANSETRON 2 MG/ML
4 INJECTION INTRAMUSCULAR; INTRAVENOUS EVERY 12 HOURS PRN
Status: DISCONTINUED | OUTPATIENT
Start: 2017-08-06 | End: 2017-08-06

## 2017-08-06 RX ORDER — SODIUM CHLORIDE, SODIUM LACTATE, POTASSIUM CHLORIDE, CALCIUM CHLORIDE 600; 310; 30; 20 MG/100ML; MG/100ML; MG/100ML; MG/100ML
INJECTION, SOLUTION INTRAVENOUS CONTINUOUS
Status: DISCONTINUED | OUTPATIENT
Start: 2017-08-06 | End: 2017-08-06

## 2017-08-06 RX ORDER — ACETAMINOPHEN 10 MG/ML
1000 INJECTION, SOLUTION INTRAVENOUS ONCE
Status: DISCONTINUED | OUTPATIENT
Start: 2017-08-06 | End: 2017-08-06

## 2017-08-06 RX ORDER — HYDROMORPHONE HCL IN 0.9% NACL 6 MG/30 ML
PATIENT CONTROLLED ANALGESIA SYRINGE INTRAVENOUS CONTINUOUS
Status: DISCONTINUED | OUTPATIENT
Start: 2017-08-06 | End: 2017-08-07

## 2017-08-06 RX ORDER — FAMOTIDINE 10 MG/ML
20 INJECTION INTRAVENOUS ONCE
Status: DISCONTINUED | OUTPATIENT
Start: 2017-08-06 | End: 2017-08-06

## 2017-08-06 RX ORDER — KETOROLAC TROMETHAMINE 30 MG/ML
30 INJECTION, SOLUTION INTRAMUSCULAR; INTRAVENOUS EVERY 6 HOURS
Status: COMPLETED | OUTPATIENT
Start: 2017-08-06 | End: 2017-08-07

## 2017-08-06 RX ORDER — OXYTOCIN 10 [USP'U]/ML
INJECTION, SOLUTION INTRAMUSCULAR; INTRAVENOUS
Status: DISCONTINUED | OUTPATIENT
Start: 2017-08-06 | End: 2017-08-06

## 2017-08-06 RX ADMIN — SODIUM CHLORIDE, SODIUM LACTATE, POTASSIUM CHLORIDE, AND CALCIUM CHLORIDE: .6; .31; .03; .02 INJECTION, SOLUTION INTRAVENOUS at 06:08

## 2017-08-06 RX ADMIN — FAMOTIDINE 20 MG: 10 INJECTION, SOLUTION INTRAVENOUS at 04:08

## 2017-08-06 RX ADMIN — FENTANYL CITRATE 10 MCG: 50 INJECTION, SOLUTION INTRAMUSCULAR; INTRAVENOUS at 04:08

## 2017-08-06 RX ADMIN — DIPHENHYDRAMINE HYDROCHLORIDE 25 MG: 25 CAPSULE ORAL at 04:08

## 2017-08-06 RX ADMIN — Medication: at 06:08

## 2017-08-06 RX ADMIN — METOCLOPRAMIDE 10 MG: 5 INJECTION, SOLUTION INTRAMUSCULAR; INTRAVENOUS at 04:08

## 2017-08-06 RX ADMIN — SODIUM CITRATE AND CITRIC ACID MONOHYDRATE 30 ML: 500; 334 SOLUTION ORAL at 04:08

## 2017-08-06 RX ADMIN — BUPIVACAINE HYDROCHLORIDE IN DEXTROSE 1.6 ML: 7.5 INJECTION, SOLUTION SUBARACHNOID at 04:08

## 2017-08-06 RX ADMIN — KETOROLAC TROMETHAMINE 30 MG: 30 INJECTION, SOLUTION INTRAMUSCULAR at 05:08

## 2017-08-06 RX ADMIN — PHENYLEPHRINE HYDROCHLORIDE 100 MCG: 10 INJECTION INTRAVENOUS at 04:08

## 2017-08-06 RX ADMIN — OXYTOCIN 20 UNITS: 10 INJECTION, SOLUTION INTRAMUSCULAR; INTRAVENOUS at 06:08

## 2017-08-06 RX ADMIN — ACETAMINOPHEN 1000 MG: 10 INJECTION, SOLUTION INTRAVENOUS at 09:08

## 2017-08-06 RX ADMIN — CEFAZOLIN SODIUM 2 G: 2 SOLUTION INTRAVENOUS at 04:08

## 2017-08-06 RX ADMIN — OXYTOCIN 30 UNITS: 10 INJECTION, SOLUTION INTRAMUSCULAR; INTRAVENOUS at 05:08

## 2017-08-06 RX ADMIN — Medication: at 07:08

## 2017-08-06 RX ADMIN — DOCUSATE SODIUM 200 MG: 100 CAPSULE, LIQUID FILLED ORAL at 09:08

## 2017-08-06 RX ADMIN — SODIUM CHLORIDE, SODIUM LACTATE, POTASSIUM CHLORIDE, AND CALCIUM CHLORIDE: .6; .31; .03; .02 INJECTION, SOLUTION INTRAVENOUS at 05:08

## 2017-08-06 RX ADMIN — ACETAMINOPHEN 1000 MG: 10 INJECTION, SOLUTION INTRAVENOUS at 11:08

## 2017-08-06 RX ADMIN — Medication 41.65 MILLI-UNITS/MIN: at 08:08

## 2017-08-06 RX ADMIN — PHENYLEPHRINE HYDROCHLORIDE 200 MCG: 10 INJECTION INTRAVENOUS at 04:08

## 2017-08-06 RX ADMIN — FENTANYL CITRATE 90 MCG: 50 INJECTION, SOLUTION INTRAMUSCULAR; INTRAVENOUS at 05:08

## 2017-08-06 RX ADMIN — Medication: at 12:08

## 2017-08-06 RX ADMIN — KETOROLAC TROMETHAMINE 30 MG: 30 INJECTION, SOLUTION INTRAMUSCULAR at 09:08

## 2017-08-06 NOTE — ANESTHESIA PROCEDURE NOTES
Spinal    Diagnosis:   Patient location during procedure: OR  Start time: 2017 4:47 AM  Timeout: 2017 4:47 AM  End time: 2017 4:48 AM  Staffing  Anesthesiologist: BRADLEY IBARRA  Performed: anesthesiologist   Preanesthetic Checklist  Completed: patient identified, site marked, surgical consent, pre-op evaluation, timeout performed, IV checked, risks and benefits discussed and monitors and equipment checked  Spinal Block  Patient position: sitting  Prep: ChloraPrep  Patient monitoring: heart rate, cardiac monitor and continuous pulse ox  Approach: midline  Location: L4-5  Injection technique: single shot  CSF Fluid: clear free-flowing CSF  Needle  Needle type: pencil-tip   Needle gauge: 25 G  Needle length: 3.5 in  Additional Documentation: incremental injection, negative aspiration for heme and left transient paresthesia  Needle localization: anatomical landmarks  Assessment  Sensory level: T6   Dermatomal levels determined by alcohol wipe  Ease of block: easy  Patient's tolerance of the procedure: comfortable throughout block  Medications:  Bolus administered: 1.6 mL of 0.75 bupivacaine  Opioid administered: 10 mcg of   fentanyl

## 2017-08-06 NOTE — ANESTHESIA PREPROCEDURE EVALUATION
08/06/2017  Kelley Ortega is a 37 y.o., female.    Anesthesia Evaluation    I have reviewed the Patient Summary Reports.     I have reviewed the Medications.     Review of Systems  Anesthesia Hx:  No problems with previous Anesthesia    Social:  Former Smoker, No Alcohol Use    Cardiovascular:   Exercise tolerance: good    Neurological:   Neuromuscular Disease,        Physical Exam  General:  Well nourished    Airway/Jaw/Neck:  Airway Findings: Mouth Opening: Normal Tongue: Normal  General Airway Assessment: Adult  Mallampati: II  TM Distance: Normal, at least 6 cm  Jaw/Neck Findings:  Neck ROM: Normal ROM      Dental:  Dental Findings: In tact   Chest/Lungs:  Chest/Lungs Findings: Clear to auscultation, Normal Respiratory Rate     Heart/Vascular:  Heart Findings: Rate: Normal        Mental Status:  Mental Status Findings:  Cooperative, Alert and Oriented         Anesthesia Plan  Type of Anesthesia, risks & benefits discussed:  Anesthesia Type:  spinal  Patient's Preference:   Intra-op Monitoring Plan: standard ASA monitors  Intra-op Monitoring Plan Comments:   Post Op Pain Control Plan: PCA, multimodal analgesia, IV/PO Opioids PRN and per primary service following discharge from PACU  Post Op Pain Control Plan Comments:   Induction:   IV  Beta Blocker:  Patient is not currently on a Beta-Blocker (No further documentation required).       Informed Consent: Patient understands risks and agrees with Anesthesia plan.  Questions answered. Anesthesia consent signed with patient.  ASA Score: 2     Day of Surgery Review of History & Physical:    H&P update referred to the surgeon.         Ready For Surgery From Anesthesia Perspective.

## 2017-08-06 NOTE — TRANSFER OF CARE
Anesthesia Transfer of Care Note    Patient: Kelley Ortega    Procedure(s) Performed: Procedure(s) (LRB):  DELIVERY- SECTION (N/A)    Patient location: Labor and Delivery    Anesthesia Type: spinal    Transport from OR: Transported from OR on room air with adequate spontaneous ventilation    Post pain: adequate analgesia    Post assessment: no apparent anesthetic complications    Post vital signs: stable    Level of consciousness: awake, alert and oriented    Nausea/Vomiting: no nausea/vomiting    Complications: none    Transfer of care protocol was followed      Last vitals:   Visit Vitals  Ht 5' (1.524 m)   Wt 71.7 kg (158 lb)   BMI 30.86 kg/m²

## 2017-08-06 NOTE — HPI
38 y/o  at 39 2/7 weeks, previous  section x 3 presents in labor. Patient had issues with prescription and non prescription drugs in the pregnancy and reportedly bought narcotics on the street to treat chronic pains. She was offered a referral to Pain Management but said she had stopped several weeks ago on her own and didn't need the referral. She says she has not used any drugs in several weeks. She had Inadequate medical care.

## 2017-08-06 NOTE — H&P
[facility]  Obstetrics  History & Physical    Patient Name: Kelley Ortega  MRN: [id number]  Admission Date: 2017  Primary Care Provider: Shannon Ferrer MD    Subjective:     Principal Problem:Pregnant in labor at term    History of Present Illness:  36 y/o  at 39 2/7 weeks  ,previous  section x 3 presents in labor    Obstetric HPI:  Patient reports Date/time of onset: 0030 hours 2017 , Frequency: Every 10-15 minutes and Intensity: strong contractions, active fetal movement, Yes vaginal bleeding , No loss of fluid     This pregnancy has been complicated by AMA    Obstetric History       T1      L3     SAB0   TAB0   Ectopic1   Multiple0   Live Births3       # Outcome Date GA Lbr Reji/2nd Weight Sex Delivery Anes PTL Lv   6 Current            5     3.374 kg (7 lb 7 oz) M CS-LTranv  N JELLY   4 Term  40w0d  3.345 kg (7 lb 6 oz) F CS-LTranv  N JELLY   3 Ectopic            2 AB            1 Para  40w0d  3.742 kg (8 lb 4 oz) F CS-LTranv  N JELLY        Past Medical History:   Diagnosis Date    Anemia     Carpal tunnel syndrome      Past Surgical History:   Procedure Laterality Date    BONY PELVIS SURGERY       SECTION, CLASSIC      HIP SURGERY      TUBAL LIGATION         PTA Medications   Medication Sig    ondansetron (ZOFRAN) 4 MG tablet Take 1 tablet (4 mg total) by mouth every 6 (six) hours as needed for Nausea.    PRENATAL VIT CALC,IRON,FOLIC (PRENATAL VITAMIN ORAL) Take 1 tablet by mouth Daily.       Review of patient's allergies indicates:  No Known Allergies     Family History     None        Social History Main Topics    Smoking status: Former Smoker     Quit date: 2015    Smokeless tobacco: Never Used    Alcohol use No    Drug use: No    Sexual activity: Yes     Partners: Male     Birth control/ protection: None     Review of Systems   Constitutional: Negative.    HENT: Negative.    Eyes: Negative.     Respiratory: Negative.    Cardiovascular: Negative.    Gastrointestinal: Negative.    Endocrine: Negative.    Genitourinary: Negative.    Musculoskeletal: Negative.    Skin:  Negative.   Neurological: Negative.    Hematological: Negative.    Psychiatric/Behavioral: Negative.    Breast: negative.       Objective:     Vital Signs (Most Recent):    Vital Signs (24h Range):           There is no height or weight on file to calculate BMI.    FHT: 150s Cat 2 (non-reassuring)  TOCO:  Q 3-6 minutes    Physical Exam:   Constitutional: She is oriented to person, place, and time. She appears well-developed and well-nourished.    HENT:   Head: Normocephalic and atraumatic.   Nose: Nose normal.    Eyes: Conjunctivae and EOM are normal. Pupils are equal, round, and reactive to light.    Neck: Normal range of motion. Neck supple.    Cardiovascular: Normal rate, regular rhythm, normal heart sounds and intact distal pulses.     Pulmonary/Chest: Effort normal and breath sounds normal.        Abdominal: Soft. Bowel sounds are normal.             Musculoskeletal: Normal range of motion and moves all extremeties.       Neurological: She is alert and oriented to person, place, and time. She has normal reflexes.    Skin: Skin is warm and dry.    Psychiatric: She has a normal mood and affect. Her behavior is normal. Judgment and thought content normal.       Cervix:  Dilation:  1  Effacement:  75%  Station: -3  Presentation: Vertex     Significant Labs:  Lab Results   Component Value Date    GROUPTR O POS 2008       CBC:   Recent Labs  Lab 17  0426   WBC 11.79   RBC 3.92*   HGB 9.3*   HCT 28.5*      MCV 73*   MCH 23.7*   MCHC 32.6     CMP: No results for input(s): GLU, CALCIUM, ALBUMIN, PROT, NA, K, CO2, CL, BUN, CREATININE, ALKPHOS, ALT, AST, BILITOT in the last 48 hours.  I have personallly reviewed all pertinent lab results from the last 24 hours.    Assessment/Plan:     37 y.o. female  at 39w2d for:    *  Pregnant    PLAN Stat Repeat  section            Andrea Love MD  Obstetrics  [facility]

## 2017-08-06 NOTE — SUBJECTIVE & OBJECTIVE
"Obstetric HPI:  Patient reports Date/time of onset:  2017 at 0030 hours , Frequency: Every 10-15 minutes and Intensity: strong contractions, active fetal movement, Yes vaginal bleeding , No loss of fluid     This pregnancy has been complicated by AMA , Inappropriate drug usage both narcotics and THC purchased on "the street".    Obstetric History       T1      L3     SAB0   TAB0   Ectopic1   Multiple0   Live Births3       # Outcome Date GA Lbr Reji/2nd Weight Sex Delivery Anes PTL Lv   6 Current            5     3.374 kg (7 lb 7 oz) M CS-LTranv  N JELLY   4 Term  40w0d  3.345 kg (7 lb 6 oz) F CS-LTranv  N JELLY   3 Ectopic            2 AB            1 Para  40w0d  3.742 kg (8 lb 4 oz) F CS-LTranv  N JELLY        Past Medical History:   Diagnosis Date    Anemia     Carpal tunnel syndrome      Past Surgical History:   Procedure Laterality Date    BONY PELVIS SURGERY       SECTION, CLASSIC      HIP SURGERY      TUBAL LIGATION         PTA Medications   Medication Sig    ondansetron (ZOFRAN) 4 MG tablet Take 1 tablet (4 mg total) by mouth every 6 (six) hours as needed for Nausea.    PRENATAL VIT CALC,IRON,FOLIC (PRENATAL VITAMIN ORAL) Take 1 tablet by mouth Daily.       Review of patient's allergies indicates:  No Known Allergies     Family History     None        Social History Main Topics    Smoking status: Former Smoker     Quit date: 2015    Smokeless tobacco: Never Used    Alcohol use No    Drug use: No    Sexual activity: Yes     Partners: Male     Birth control/ protection: None     Review of Systems   Constitutional: Negative.    HENT: Negative.    Eyes: Negative.    Respiratory: Negative.    Cardiovascular: Negative.    Gastrointestinal: Negative.    Endocrine: Negative.    Genitourinary: Negative.    Musculoskeletal: Negative.    Skin:  Negative.   Neurological: Negative.    Hematological: Negative.    Psychiatric/Behavioral: Negative.    Breast: " negative.       Objective:     Vital Signs (Most Recent):    Vital Signs (24h Range):           There is no height or weight on file to calculate BMI.    FHT: 150s Cat 2 (non-reassuring)  TOCO:  Q 3-6 minutes    Physical Exam:   Constitutional: She is oriented to person, place, and time. She appears well-developed and well-nourished.    HENT:   Head: Normocephalic and atraumatic.   Nose: Nose normal.    Eyes: Conjunctivae and EOM are normal. Pupils are equal, round, and reactive to light.    Neck: Normal range of motion. Neck supple.    Cardiovascular: Normal rate, regular rhythm, normal heart sounds and intact distal pulses.     Pulmonary/Chest: Effort normal and breath sounds normal.        Abdominal: Soft. Bowel sounds are normal.             Musculoskeletal: Normal range of motion and moves all extremeties.       Neurological: She is alert and oriented to person, place, and time. She has normal reflexes.    Skin: Skin is warm and dry.    Psychiatric: She has a normal mood and affect. Her behavior is normal. Judgment and thought content normal.       Cervix:  Dilation:  1  Effacement:  75%  Station: -3  Presentation: Vertex     Significant Labs:  Lab Results   Component Value Date    GROUPTRH O POS 09/28/2008       CBC:   Recent Labs  Lab 08/06/17  0426   WBC 11.79   RBC 3.92*   HGB 9.3*   HCT 28.5*      MCV 73*   MCH 23.7*   MCHC 32.6     CMP: No results for input(s): GLU, CALCIUM, ALBUMIN, PROT, NA, K, CO2, CL, BUN, CREATININE, ALKPHOS, ALT, AST, BILITOT in the last 48 hours.  I have personallly reviewed all pertinent lab results from the last 24 hours.

## 2017-08-06 NOTE — HOSPITAL COURSE
Contractions with late decelerations noted on admit. Patient prepared for immediate  section. H/H 9.3/28.5. Did not bring her BTL consent form, but we remember her signing it in the office and she is absolutely certain she does not want to be pregnant again.    2017 0600 hours Repeat Low transverse  section and bilateral tubal ligation. No complications.    [id number] hours Doing well. Baby bottle feeding    2017 1300 Wants to go home. Doing well. Not much pain

## 2017-08-06 NOTE — TREATMENT PLAN
Call placed to Dr. Love, notified of pt arrival, c/o ctx and history. MD gives orders to prepare pt for c section.

## 2017-08-06 NOTE — L&D DELIVERY NOTE
[facility] [place]   Section   Operative Note    SUMMARY     Date of Procedure: 2017     Procedure: Procedure(s) (LRB):  DELIVERY- SECTION (N/A) AND BILATERAL TUBAL LIGATION    Surgeon(s) and Role:     * Andrea Love MD - Primary    Assisting Surgeon: None    Pre-Operative Diagnosis: Repeat Section x3  IUP at 39 2/7 weeks  Advanced maternal age  Prescription drug abuse  Desires permanent sterilization   Active labor with fetal heart rate decelerations    Post-Operative Diagnosis: Post-Op Diagnosis Codes:  Same as above      * Pregnant [Z33.1]    Anesthesia: Spinal/Epidural    Technical Procedures Used: Repeat low transverse  section and bilateral tubal ligation           Description of the Findings of the Procedure: large ventral hernia at umbilicus and pelvic adhesions    Significant Surgical Tasks Conducted by the Assistant(s), if Applicable: none    Complications: No    Blood Loss: * No values recorded between 2017  5:03 AM and 2017  6:02 AM *     With patient in supine position, the legs are  and Robles Catheter placed and positioning to supine done.   Abdomen prepped with Chloroprep and 3 minute drying time allowed prior to draping of the abdomen.   Time out taken with OR team members.  Pfannenstiel Incision made through the skin, transverse fascial incision developed, rectus muscles  in the midline and the peritoneum entered. Large ventral hernia with defect extending distal to the umbilicus so incision went from subcutaneous and no intervening fascia  or muscle  no adhesions noted.  The lower uterine segment and position of the fetus identified.   Bladder flap up high and  taken down through transverse peritoneal incision.    Low Transverse Incision made through well developer lower uterine segment and extended laterally with blunt dissection.   Clear fluid noted.  Infant delivered from vertex presentation.  Cord clamped after one minute  and  handed to attending nurse.  Cord blood taken, placenta delivered.  The uterus was exteriorized.  The edges of the uterine incision are grasped with Colon clamps at the angles and the inferior and superior midline edges of the incision.    Closure with running lock 0 Monocryl, starting at each angle, tying in the midline.   Observation for bleeding with suture of any bleeding along the hysterotomy line. Bilateral tubal ligation done using 2-0 Plain on a reel with adequate specimen. Patient showed evidence of prior left partial salpingectomy from ectopic pregnancy  With good hemostasis noted, the anterior pelvis is rinsed with sterile saline.   Right and left adnexa with normal anatomy.     Closure of the abdomen with 2 0 Chromic running of the peritoneum, fascial closure with 0 Vicryl starting at the each angle and tying the knot at the center.  Skin closure with Insorb skin staples.  Wound dressed with Aquacel.  Request made for urine toxicology based on patient;s prior history..          Specimens:   Specimen (12h ago through future)    Start     Ordered    17  Specimen to Pathology - Surgery  Once     Comments:  L fallopian tube segment      17  Specimen to Pathology - Surgery  Once     Comments:  R fallopian tube segment      17          Condition: Good    Disposition: PACU - hemodynamically stable.    Attestation: Good         Delivery Information for  Campos Ortega    Birth information:  YOB: 2017   Time of birth: 5:10 AM   Sex: female   Head Delivery Date/Time: 2017  5:10 AM   Delivery type: , Low Transverse   Gestational Age: 39w2d    Delivery Providers    Delivering clinician:  Andrea Love MD   Other personnel:   Provider Role   ENRRIQUE Tijerina RN Aimee B. Barrois, JENNIFERP                [place] Measurements    Weight:  3420 g           [place] Assessment     Living status:  Living  Apgars:     1 Minute:   5 Minute:   10 Minute:   15 Minute:   20 Minute:     Skin Color:   1  1       Heart Rate:   2  2       Reflex Irritability:   2  2       Muscle Tone:   2  2       Respiratory Effort:   2  2       Total:   9  9                      Assisted Delivery Details:    Forceps attempted?:  No  Vacuum extractor attempted?:  No         Shoulder Dystocia    Shoulder dystocia present?:  No           Presentation and Position    Presentation:   Vertex   Position:       Occiput            Interventions/Resuscitation    Method:  Bulb Suctioning, Tactile Stimulation, NICU Attended       Cord    Vessels:  3 vessels  Complications:  None  Delayed Cord Clamping?:  No  Cord Clamped Date/Time:  2017  5:10 AM  Cord Blood Disposition:  Sent with Baby  Gases Sent?:  No  Stem Cell Collection (by MD):  No       Placenta    Date and time:  2017  5:11 AM  Removal:  Manual removal  Appearance:  Intact           Labor Events:       labor: No     Labor Onset Date/Time:         Dilation Complete Date/Time:         Start Pushing Date/Time:       Rupture Date/Time:              Rupture type:           Fluid Amount:        Fluid Color:        Fluid Odor:        Membrane Status (PeriCalm):        Rupture Date/Time (PeriCalm):        Fluid Amount (PeriCalm):        Fluid Color (PeriCalm):         steroids:       Antibiotics given for GBS: Yes     Induction:       Indications for induction:        Augmentation:       Indications for augmentation:       Labor complications: None     Additional complications:          Cervical ripening:                     Delivery:      Episiotomy: None     Indication for Episiotomy:       Perineal Lacerations: None Repaired:      Periurethral Laceration: none Repaired:     Labial Laceration: none Repaired:     Sulcus Laceration: none Repaired:     Vaginal Laceration: No Repaired:     Cervical Laceration: No Repaired:     Repair suture: None      Repair # of packets:       Vaginal delivery QBL (mL): 0      QBL (mL): 495     Combined Blood Loss (mL): 495     Vaginal Sweep Performed: Yes     Surgicount Correct: Yes       Other providers:       Anesthesia    Method:  Spinal          Details (if applicable):  Trial of Labor No    Categorization: Repeat    Priority: Emergency   Indications for : Repeat Section   Incision Type: low transverse     Additional  information:  Forceps:    Vacuum:    Breech:    Observed anomalies    Other (Comments):

## 2017-08-06 NOTE — H&P
[facility]  Obstetrics   Sectionl    Patient Name: Kelley Ortega  MRN: [id number]  Admission Date: 2017  Primary Care Provider: Shannon Ferrer MD    Subjective:     Principal Problem:Pregnant at term in labor    History of Present Illness:  36 y/o  at 39 2/7 weeks, previous  section x 3 presents in labor. Patient had issues with prescription and non prescription drugs in the pregnancy and reportedly bought narcotics on the street to treat chronic pains. She was offered a referral to Pain Management but said she stopped on her own and didn't need the referral. She says she has not used any drugs in several weeks. She had Inadequate medical care.    No new subjective & objective note has been filed under this hospital service since the last note was generated.    Assessment/Plan:     37 y.o. female  at 39w2d for emergency repeat LTCS and BTL:    PLAN:  Routine post op care.  Service: Obstetrics and Gynecology      Andrea Love MD  Obstetrics  [facility]

## 2017-08-06 NOTE — PLAN OF CARE
Problem: Patient Care Overview  Goal: Plan of Care Review  Outcome: Ongoing (interventions implemented as appropriate)  POC reviewed with pt, pt verb understanding.

## 2017-08-06 NOTE — NURSING
Report received from ASHVIN Colorado RN, care assumed. AAOx3, assessment completed. IV patent infusing Pitocin @ 999 cc/h, Dilaudid PCA as needed, site free of redness or swelling. C.o incisional pain, rates pain 9/10 using pain scale. Pt enc to use pca for pain relief, verb understanding.  POC reviewed with pt, pt verb understanding. Call bell within reach, will monitor.

## 2017-08-07 LAB
BASOPHILS # BLD AUTO: 0.02 K/UL
BASOPHILS NFR BLD: 0.2 %
DIFFERENTIAL METHOD: ABNORMAL
EOSINOPHIL # BLD AUTO: 0 K/UL
EOSINOPHIL NFR BLD: 0.1 %
ERYTHROCYTE [DISTWIDTH] IN BLOOD BY AUTOMATED COUNT: 16.9 %
HBV SURFACE AG SERPL QL IA: NEGATIVE
HCT VFR BLD AUTO: 22.7 %
HGB BLD-MCNC: 7.4 G/DL
LYMPHOCYTES # BLD AUTO: 0.5 K/UL
LYMPHOCYTES NFR BLD: 4 %
MCH RBC QN AUTO: 23.7 PG
MCHC RBC AUTO-ENTMCNC: 32.6 G/DL
MCV RBC AUTO: 73 FL
MONOCYTES # BLD AUTO: 0.3 K/UL
MONOCYTES NFR BLD: 2.9 %
NEUTROPHILS # BLD AUTO: 10.5 K/UL
NEUTROPHILS NFR BLD: 93.7 %
PLATELET # BLD AUTO: 160 K/UL
PLATELET BLD QL SMEAR: ABNORMAL
PMV BLD AUTO: 10.4 FL
RBC # BLD AUTO: 3.12 M/UL
RPR SER QL: NORMAL
RUBV IGG SER-ACNC: 13 IU/ML
RUBV IGG SER-IMP: REACTIVE
WBC # BLD AUTO: 11.28 K/UL

## 2017-08-07 PROCEDURE — 11000001 HC ACUTE MED/SURG PRIVATE ROOM

## 2017-08-07 PROCEDURE — 85025 COMPLETE CBC W/AUTO DIFF WBC: CPT

## 2017-08-07 PROCEDURE — 36415 COLL VENOUS BLD VENIPUNCTURE: CPT

## 2017-08-07 PROCEDURE — 25000003 PHARM REV CODE 250: Performed by: OBSTETRICS & GYNECOLOGY

## 2017-08-07 PROCEDURE — 63600175 PHARM REV CODE 636 W HCPCS: Performed by: OBSTETRICS & GYNECOLOGY

## 2017-08-07 RX ADMIN — SODIUM CHLORIDE, SODIUM LACTATE, POTASSIUM CHLORIDE, AND CALCIUM CHLORIDE: .6; .31; .03; .02 INJECTION, SOLUTION INTRAVENOUS at 02:08

## 2017-08-07 RX ADMIN — DOCUSATE SODIUM 200 MG: 100 CAPSULE, LIQUID FILLED ORAL at 07:08

## 2017-08-07 RX ADMIN — IBUPROFEN 600 MG: 600 TABLET, FILM COATED ORAL at 11:08

## 2017-08-07 RX ADMIN — IBUPROFEN 600 MG: 600 TABLET, FILM COATED ORAL at 06:08

## 2017-08-07 RX ADMIN — OXYCODONE HYDROCHLORIDE AND ACETAMINOPHEN 1 TABLET: 10; 325 TABLET ORAL at 04:08

## 2017-08-07 RX ADMIN — OXYCODONE HYDROCHLORIDE AND ACETAMINOPHEN 1 TABLET: 10; 325 TABLET ORAL at 07:08

## 2017-08-07 RX ADMIN — ACETAMINOPHEN 1000 MG: 10 INJECTION, SOLUTION INTRAVENOUS at 06:08

## 2017-08-07 RX ADMIN — DOCUSATE SODIUM 200 MG: 100 CAPSULE, LIQUID FILLED ORAL at 08:08

## 2017-08-07 RX ADMIN — OXYCODONE HYDROCHLORIDE AND ACETAMINOPHEN 1 TABLET: 10; 325 TABLET ORAL at 03:08

## 2017-08-07 RX ADMIN — KETOROLAC TROMETHAMINE 30 MG: 30 INJECTION, SOLUTION INTRAMUSCULAR at 12:08

## 2017-08-07 RX ADMIN — DIPHENHYDRAMINE HYDROCHLORIDE 25 MG: 25 CAPSULE ORAL at 03:08

## 2017-08-07 RX ADMIN — MAGNESIUM HYDROXIDE 2400 MG: 400 SUSPENSION ORAL at 08:08

## 2017-08-07 RX ADMIN — OXYCODONE HYDROCHLORIDE AND ACETAMINOPHEN 1 TABLET: 10; 325 TABLET ORAL at 11:08

## 2017-08-07 RX ADMIN — BISACODYL 10 MG: 10 SUPPOSITORY RECTAL at 04:08

## 2017-08-07 RX ADMIN — OXYCODONE HYDROCHLORIDE AND ACETAMINOPHEN 1 TABLET: 10; 325 TABLET ORAL at 09:08

## 2017-08-07 NOTE — CONSULTS
Consult for patient with hx of purchase of pain meds on street when could not obtain RX.   SW reviewed chart, noted SW notes of 6/14/17. Noted negative tox screen this admission.   Discussed with patient. Patient reports this problem is in the past, that she has not been using medication that are not prescribed to her.   JACKIE discussed with ENRRIQUE Moore who reports patient using her scheduled pain medication.  No apparent current issue.   SW will follow, assist as needed.

## 2017-08-07 NOTE — PLAN OF CARE
"Problem: Breastfeeding (Adult,Obstetrics,Pediatric)  Intervention: Promote Positive Maternal Experience  Patient was formula feeding infant and stated that she did not think her baby "likes the milk", she then stated that her breast were leaking, encouraged breastfeeding, offered support, patient agreed to breast and bottle feed, assisted with latch on, instructed patient on positioning and proper latch, encouraged to call for assistance when needed.        "

## 2017-08-07 NOTE — LACTATION NOTE
This note was copied from a baby's chart.     08/07/17 0800   Maternal Infant Feeding   Maternal Emotional State independent;relaxed   Presence of Pain no   Time Spent (min) 0-15 min   Latch Assistance no   Breastfeeding Education adequate infant intake;adequate milk volume;importance of skin-to-skin contact   Feeding Infant   Feeding Readiness Cues quiet  (sleeping; not showing feeding cues)   Lactation Referrals   Lactation Consult Initial assessment;Knowledge deficit   Lactation Interventions   Attachment Promotion counseling provided;privacy provided;role responsibility promoted;rooming-in promoted;skin-to-skin contact encouraged;infant-mother separation minimized   Breastfeeding Assistance feeding cue recognition promoted;feeding on demand promoted;support offered   Maternal Breastfeeding Support diary/feeding log utilized;encouragement offered;infant-mother separation minimized;lactation counseling provided   Mother was originally admitted as a ; Changed her mind last night to start breastfeeding; Mother states that breastfeeding went well overnight; Denies pain or discomfort with nursing; Educated on breastfeeding basics; Encouraged to feed on cue, at least 8 or more times in 24 hours; Baby sleeping at this time and not showing any feeding cues; Lactation phone number provided to mother; Encouraged to call with next feeding so latch can be assessed; Verbalized understanding with good recall

## 2017-08-07 NOTE — PROGRESS NOTES
Patient request ward and PCA to be discontinued, patient is itching and would like to shower.  Ward and PCA discontinued.  Patient given Benadryl for itching.

## 2017-08-07 NOTE — PLAN OF CARE
Problem: Patient Care Overview  Goal: Plan of Care Review  Outcome: Ongoing (interventions implemented as appropriate)  Plan of care reviewed with patient and her significant other, all questions answered.

## 2017-08-07 NOTE — PLAN OF CARE
Problem: Patient Care Overview  Goal: Individualization & Mutuality  Outcome: Ongoing (interventions implemented as appropriate)  VSS, LR iv fluids infusing at 125 ml/hr, Dilaudid PCA in use, ward catheter patent and draining clear yellow urine, Toradol iv and Tylenol iv also given for pain control, aquacel dressing intact, unable to visualize incision, patient has decided to breast and bottle feed at this time, infant's father is assisting in care.

## 2017-08-07 NOTE — PLAN OF CARE
08/07/17 1456   Discharge Assessment   Assessment Type Discharge Planning Assessment   Assessment information obtained from? Patient;Medical Record   Expected Length of Stay (days) 4   Communicated expected length of stay with patient/caregiver yes   Prior to hospitilization cognitive status: Alert/Oriented   Prior to hospitalization functional status: Independent   Current cognitive status: Alert/Oriented   Current Functional Status: Independent   Arrived From home or self-care   Lives With child(andrez), dependent   Able to Return to Prior Arrangements yes   Is patient able to care for self after discharge? Yes   How many people do you have in your home that can help with your care after discharge? 2   Who are your caregiver(s) and their phone number(s)? older children, other family   Patient's perception of discharge disposition home or selfcare   Readmission Within The Last 30 Days no previous admission in last 30 days   Patient currently being followed by outpatient case management? Unable to determine (comments)   Patient currently receives home health services? No   Does the patient currently use HME? No   Patient currently receives private duty nursing? No   Patient currently receives any other outside agency services? No   Equipment Currently Used at Home none   Do you have any problems affording any of your prescribed medications? TBD   Is the patient taking medications as prescribed? yes   Does the patient have transportation to healthcare appointments? Yes   Transportation Available family or friend will provide;Medicaid transportation   On Dialysis? No   Does the patient receive services at the [facility]madin Clinic? No   Discharge Plan A Home with family   Patient/Family In Agreement With Plan yes   JAKCIE reviewed chart, met with patient, baby's father present with her permission. JACKIE reviewed patient help at home, her responsibility in managing her care at home with follow up visits. Patient reports has help  with family members for transportation, to obtain meds. She plans baby's follow up appointments with [facility] in [facility] and will make the appointment within a week. Observed patient interacting appropriately with . SW provided SW contact information, encouraged her to call if has any questions. SW will assist as needed.

## 2017-08-07 NOTE — ANESTHESIA POSTPROCEDURE EVALUATION
Anesthesia Post Evaluation    Patient: Kelley Ortega    Procedure(s) Performed: Procedure(s) (LRB):  DELIVERY- SECTION (N/A)    Final Anesthesia Type: spinal  Patient location during evaluation: labor & delivery  Patient participation: Yes- Able to Participate  Level of consciousness: awake and alert and oriented  Post-procedure vital signs: reviewed and stable  Pain management: adequate  Airway patency: patent  PONV status at discharge: No PONV  Anesthetic complications: no      Cardiovascular status: blood pressure returned to baseline and hemodynamically stable  Respiratory status: unassisted, spontaneous ventilation and room air  Hydration status: euvolemic  Follow-up not needed.        Visit Vitals  /73 (BP Location: Right arm, Patient Position: Sitting, BP Method: Automatic)   Pulse 110   Temp 37.7 °C (99.9 °F) (Oral)   Resp 20   Ht 5' (1.524 m)   Wt 71.7 kg (158 lb)   SpO2 98%   Breastfeeding? Unknown   BMI 30.86 kg/m²       Pain/Everardo Score: Pain Rating Prior to Med Admin: 6 (2017  6:02 AM)  Pain Rating Post Med Admin: 4 (2017  4:13 AM)

## 2017-08-07 NOTE — PROGRESS NOTES
Mother Baby Care Guide, Breastfeeding Guide, and Safe Formula Guide given to patient, contents reviewed, no questions at this time.

## 2017-08-08 VITALS
RESPIRATION RATE: 18 BRPM | SYSTOLIC BLOOD PRESSURE: 112 MMHG | BODY MASS INDEX: 31.02 KG/M2 | TEMPERATURE: 98 F | OXYGEN SATURATION: 98 % | HEART RATE: 77 BPM | HEIGHT: 60 IN | DIASTOLIC BLOOD PRESSURE: 72 MMHG | WEIGHT: 158 LBS

## 2017-08-08 PROBLEM — Z34.90 PREGNANT: Status: RESOLVED | Noted: 2017-08-06 | Resolved: 2017-08-08

## 2017-08-08 PROCEDURE — 25000003 PHARM REV CODE 250: Performed by: OBSTETRICS & GYNECOLOGY

## 2017-08-08 RX ORDER — IBUPROFEN 600 MG/1
600 TABLET ORAL EVERY 6 HOURS
Qty: 40 TABLET | Refills: 1 | OUTPATIENT
Start: 2017-08-08 | End: 2019-05-06

## 2017-08-08 RX ORDER — OXYCODONE AND ACETAMINOPHEN 5; 325 MG/1; MG/1
1 TABLET ORAL EVERY 6 HOURS PRN
Qty: 20 TABLET | Refills: 0 | Status: SHIPPED | OUTPATIENT
Start: 2017-08-08 | End: 2020-02-23 | Stop reason: ALTCHOICE

## 2017-08-08 RX ADMIN — IBUPROFEN 600 MG: 600 TABLET, FILM COATED ORAL at 06:08

## 2017-08-08 RX ADMIN — DOCUSATE SODIUM 200 MG: 100 CAPSULE, LIQUID FILLED ORAL at 08:08

## 2017-08-08 RX ADMIN — OXYCODONE HYDROCHLORIDE AND ACETAMINOPHEN 1 TABLET: 10; 325 TABLET ORAL at 01:08

## 2017-08-08 RX ADMIN — IBUPROFEN 600 MG: 600 TABLET, FILM COATED ORAL at 12:08

## 2017-08-08 RX ADMIN — OXYCODONE HYDROCHLORIDE AND ACETAMINOPHEN 1 TABLET: 10; 325 TABLET ORAL at 08:08

## 2017-08-08 NOTE — PROGRESS NOTES
Mother and father lying in bed. Infant in mother's arms. Infant fall prevention reviewed with mother. Reviewed safety prevention facts and interventions which include the following:    --Do NOT sleep with your baby in your bed, sofa, or chair as this may place your baby at risk of serious injury and potential fall.  -When you want to sleep, first place the baby in the bassinet.  -If we find you asleep with the baby in your arms, we will move your baby to the bassinet.    Mother verbalized understanding and denies any questions. Will monitor.

## 2017-08-08 NOTE — PLAN OF CARE
Problem: Postpartum ( Delivery) (Adult,Obstetrics,Pediatric)  Goal: Signs and Symptoms of Listed Potential Problems Will be Absent, Minimized or Managed (Postpartum)  Signs and symptoms of listed potential problems will be absent, minimized or managed by discharge/transition of care (reference Postpartum ( Delivery) (Adult,Obstetrics,Pediatric) CPG).   Outcome: Ongoing (interventions implemented as appropriate)  Pain controlled with scheduled Motrin and PRN Percocet. LTV incision with aquacel dressing jade&iDonn MOHAMUD.

## 2017-08-08 NOTE — PROGRESS NOTES
Patient stated she just had a bowel movement. Voiced that she still wants to take Milk of Mag. Given MOM PRN and scheduled Colace. Informed unable to give Percocet at this time d/t amount of acetaminophen already received in past 24 hours. Informed of possible liver toxicity. Understanding voiced. MONICA.

## 2017-08-08 NOTE — DISCHARGE INSTRUCTIONS
After a Cesearean Birth    General Discharge Instructions  · May follow a regular diet, unless otherwise discussed with physician.  · Take showers, not baths unless otherwise discussed with physician.  · Activity as tolerated.  · No lifting or heavy exercise for 6 weeks, no driving for 2 weeks, no sexual intercourse, douching or tampons for 6 weeks  · May return to work/school as discussed with physician  · Discuss birth control with physician  · Breast care support bra worn at all times  · Take your RX as directed  · Lactation consultant referral number ( [phone] or [phone])    Call Your Healthcare Provider Right Away If You Have:  · A fever of 101°F or higher.  · Incisional drainage  · Heavy vaginal bleeding, clots, or vaginal discharge with foul odor.  · Redness, discharge, or pain worse than you had in the hospital.  · Burning, pain, red streaks, or lumpy areas in your breasts.  · Cracks, blisters, or blood on your nipples.  · Burning or pain when you urinate.  · Persistent nausea or vomiting.  · Severe headaches, blurred vision, dizziness or fainting.  · Feelings of extreme sadness or anxiety, or a feeling that you dont want to be with your baby.  · No bowel movement for 5 days.  · Redness, warmth, swelling, or pain in the lower leg.    Incision Care  · If you have an aquacele dressing then it stays on for 1 week.  It is waterproof and will be removed at your postop visit.  If it comes off then call your physician and keep the incision clean with warm soapy water.  · Watch your incision for signs of infection, such as increasing redness or drainage or a foul smelling odor.  · For ease of movement, hold a pillow against the incision when you get up from a lying or sitting position, and when you laugh or cough.  · Avoid heavy lifting--nothing heavier than your baby until your doctor instructs you otherwise.       Follow-Up  Schedule a  follow-up exam with your healthcare provider for  about 1 week after delivery. Contact your healthcare provider if you think you are having any problems.    Breastfeeding discharge instructions given with First Alert form and reviewed.  Also discussed:   AAP recommendation of exclusive breastfeeding for the first 6 months of life and continued breastfeeding with the introduction of supplemental foods beyond the first year of life.  Instructed on the recommendation to delay all bottle and pacifier use until after 4 weeks of age and breastfeeding is well established.  Discussed the benefits of exclusive breastfeeding for both mother and baby.  Discussed the risks of supplementation/pacifier use on the exclusivity of breastfeeding in the first 6 months. Feed the baby at the earliest sign of hunger or comfort  o Hands to mouth, sucking motions  o Rooting or searching for something to suck on  o Dont wait for crying - it is a not a late sign of hunger; it is a sign of distress     The feedings may be 8-12 times per 24hrs and will not follow a schedule   Alternate the breast you start the feeding with, or start with the breast that feels the fullest   Switch breasts when the baby takes himself off the breast or falls asleep   Keep offering breasts until the baby looks full, no longer gives hunger signs, and stays asleep when placed on his back in the crib   If the baby is sleepy and wont wake for a feeding, put the baby skin-to-skin dressed in a diaper against the mothers bare chest   Sleep near your baby   The baby should be positioned and latched on to the breast correctly  o Chest-to-chest, chin in the breast  o Babys lips are flipped outward  o Babys mouth is stretched open wide like a shout  o Babys sucking should feel like tugging to the mother  - The baby should be drinking at the breast:  o You should hear swallowing or gulping throughout the feeding  o You should see milk on the babys lips when he comes off the breast  o Your breasts should be  softer when the baby is finished feeding  o The baby should look relaxed at the end of feedings  o After the 4th day and your milk is in:  o The babys poop should turn bright yellow and be loose, watery, and seedy  o The baby should have at least 3-4 poops the size of the palm of your hand per day  o The baby should have at least 6-8 wet diapers per day  o The urine should be light yellow in color  You should drink when you are thirsty and eat a healthy diet when you are    hungry.     Take naps to get the rest you need.   Take medications and/or drink alcohol only with permission of your obstetrician    or the babys pediatrician.  You can also call the [facility],   ([phone]), Monday-Friday, 8am-5pm Central time, to get the most   up-to-date evidence-based information on the use of medications during   pregnancy and breastfeeding.      The baby should be examined by a pediatrician at 3-5 days of age; unless ordered sooner by the pediatrician.  Once your milk comes in, the baby should be back to birth weight no later than 10-14 days of age.    Lactation Services - [phone]    Primary Engorgement    If the milk is flowing, use wet or dry heat applied to the breasts for approximately 10min prior to each feeding as a comfort measure to facilitate the milk ejection reflex    Follow heat treatment with breast massage to soften hard/lumpy areas of the breast    Use unrestricted, frequent, effective feedings.      Wake baby to feed if necessary    Avoid pacifier and bottle feedings    Hand express or pump breasts to the point of comfort prn    Use cold treatments in the form of ice packs/gel packs/ frozen vegetables wrapped in a soft thin cloth and applied to the breasts for approximately 20min after each feeding until engorgement is resolved    Wear comfortable, supportive bra    Take pain medicine prn    Use anti-inflammatory medications if prescribed by physician    Other:

## 2017-08-08 NOTE — PLAN OF CARE
Problem: Patient Care Overview  Goal: Individualization & Mutuality  Outcome: Ongoing (interventions implemented as appropriate)  VSS. Ambulating in room and voiding without difficulty. FF and lochia WNL. Passing flatus and has had BM. Pain controlled with scheduled and PRN pain medication. Breast and formula feeding infant independently. POC discussed with patient. Understanding voiced. MONICA.

## 2017-08-08 NOTE — PLAN OF CARE
08/08/17 1823   Final Note   Assessment Type Final Discharge Note   Discharge Disposition Home   Discharge/Hospital Encounter Summary to (non-[facility]) PCP n/a   Referral to Outpatient Case Management complete? n/a   Referral to / orders for Home Health Complete? n/a   30 day supply of medicines given at discharge, if documented non-compliance / non-adherence? n/a   Any social issues identified prior to discharge? n/a   Did you assess the readiness or willingness of the family or caregiver to support self management of care? n/a   patient discharged prior to SW return visit. Chart reviewed, no needs identified. Patient was provided SW contact information at initial visit in even has questions.

## 2017-08-08 NOTE — DISCHARGE SUMMARY
[facility]  Obstetrics  Discharge Summary      Patient Name: Kelley Ortega  MRN: [id number]  Admission Date: 2017  Hospital Length of Stay: 2 days  Discharge Date and Time:  2017 1:38 PM  Attending Physician: Jon Love MD   Discharging Provider: Jon Love MD  Primary Care Provider: Shannon Ferrer MD    HPI: 36 y/o  at 39 2/7 weeks, previous  section x 3 presents in labor. Patient had issues with prescription and non prescription drugs in the pregnancy and reportedly bought narcotics on the street to treat chronic pains. She was offered a referral to Pain Management but said she had stopped several weeks ago on her own and didn't need the referral. She says she has not used any drugs in several weeks. She had Inadequate medical care.    Procedure(s) (LRB):  DELIVERY- SECTION (N/A)     Hospital Course:   Contractions with late decelerations noted on admit. Patient prepared for immediate  section. H/H 9.3/28.5. Did not bring her BTL consent form, but we remember her signing it in the office and she is absolutely certain she does not want to be pregnant again.    2017 0600 hours Repeat Low transverse  section and bilateral tubal ligation. No complications.    [id number] hours Doing well. Baby bottle feeding    2017 1300 Wants to go home. Doing well. Not much pain    Consults         Status Ordering Provider     IP consult to case management/social wor  Once     Provider:  (Not yet assigned)    Completed JON LOVE          Final Active Diagnoses:    Diagnosis Date Noted POA      Problems Resolved During this Admission:    Diagnosis Date Noted Date Resolved POA    PRINCIPAL PROBLEM:  Pregnant [Z33.1] 2017 Not Applicable    Declines vaginal birth after  trial [O34.219] 2017 Yes    Delivery by emergency  [O82] 2017 Unknown     Chronic     Pregnant [Z33.1] 2017 Not Applicable    Elderly multigravida in second trimester [O09.522] 2017 Yes        Labs:   CMP No results for input(s): NA, K, CL, CO2, GLU, BUN, CREATININE, CALCIUM, PROT, ALBUMIN, BILITOT, ALKPHOS, AST, ALT, ANIONGAP, ESTGFRAFRICA, EGFRNONAA in the last 48 hours., CBC   Recent Labs  Lab 17  0625   WBC 11.28   HGB 7.4*   HCT 22.7*       and All labs within the past 24 hours have been reviewed    Feeding Method: bottle    Immunizations     Date Immunization Status Dose Route/Site Given by    17 1036 MMR Deferred 0.5 mL Subcutaneous/Left deltoid Teresa Basurto RN    17 0846 Tdap Deferred 0.5 mL Intramuscular/Left deltoid Amanda Curtis RN          Delivery:    Episiotomy: None   Lacerations: None   Repair suture: None   Repair # of packets:     Blood loss (ml): 0     Birth information:  YOB: 2017   Time of birth: 5:10 AM   Sex: female   Delivery type: , Low Transverse   Gestational Age: 39w2d    Delivery Clinician:      Other providers:       Additional  information:  Forceps:    Vacuum:    Breech:    Observed anomalies      Living?:           APGARS  One minute Five minutes Ten minutes   Skin color:         Heart rate:         Grimace:         Muscle tone:         Breathing:         Totals: 9  9        Placenta: Delivered:       appearance    Pending Diagnostic Studies:     None          Discharged Condition: good    Disposition: Home or Self Care    Follow Up:  Follow-up Information     Andrea Love MD In 1 week.    Specialty:  Obstetrics  Contact information:  [address] [place] [id number] [phone]             Follow up In 1 week.           Follow up In 1 week.               Patient Instructions:     Diet general     Activity as tolerated     Call MD for:  temperature >100.4     Call MD for:  persistent nausea and vomiting or diarrhea     Call MD for:  severe uncontrolled pain      Call MD for:  redness, tenderness, or signs of infection (pain, swelling, redness, odor or green/yellow discharge around incision site)     Call MD for:  severe persistent headache     Call MD for:  worsening rash     Call MD for:  persistent dizziness, light-headedness, or visual disturbances     Call MD for:  increased confusion or weakness     Leave dressing on - Keep it clean, dry, and intact until clinic visit       Medications:  Current Discharge Medication List      START taking these medications    Details   ibuprofen (ADVIL,MOTRIN) 600 MG tablet Take 1 tablet (600 mg total) by mouth every 6 (six) hours.  Qty: 40 tablet, Refills: 1    Associated Diagnoses: Delivery by emergency ; Post-op pain      oxycodone-acetaminophen (PERCOCET) 5-325 mg per tablet Take 1 tablet by mouth every 6 (six) hours as needed for Pain (pot op pain).  Qty: 20 tablet, Refills: 0    Associated Diagnoses: Delivery by emergency ; Post-op pain         CONTINUE these medications which have NOT CHANGED    Details   PRENATAL VIT CALC,IRON,FOLIC (PRENATAL VITAMIN ORAL) Take 1 tablet by mouth Daily.         STOP taking these medications       ondansetron (ZOFRAN) 4 MG tablet Comments:   Reason for Stopping:               Andrea Love MD  Obstetrics  [facility]

## 2017-08-08 NOTE — OP NOTE
DATE OF PROCEDURE:  2017    PREOPERATIVE DIAGNOSES:  1.  Intrauterine pregnancy at 39 weeks and 2 days.  2.  Previous  section x3.  3.  Active labor.  4.  Advanced maternal age.  5.  Desires permanent sterilization.  6.  Fetal heart rate decelerations.  7.  Prescription drug abuse.  8.  Inadequate prenatal care.    POSTOPERATIVE DIAGNOSES:  1.  Intrauterine pregnancy at 39 weeks and 2 days.  2.  Previous  section x3.  3.  Active labor.  4.  Advanced maternal age.  5.  Desires permanent sterilization.  6.  Fetal heart rate decelerations.  7.  Prescription drug abuse.  8.  Inadequate prenatal care.  9.  Pelvic adhesions.  10. Large Ventral Hernia  11. Prior left partial Salpingectomy from Ectopic    SURGICAL PROCEDURE PERFORMED:  A repeat low-transverse  section with   bilateral tubal ligation.    SURGEON:  Andrea Love M.D.    ANESTHESIA:  Spinal.    ESTIMATED BLOOD LOSS:  Less than 500 mL.    COMPLICATIONS:  None.    DETAILS:  Ms. Ortega is a 37-year-old -American female G6, P3-0-2-3, at   39 weeks and 2 days, who presents to Labor and Delivery complaining of painful   contractions since 0030 hours on 2017.  The patient reports the   contractions were every 10 to 15 minutes, very strong, but there was active   fetal movement.  There is no vaginal bleeding and no rupture of the membranes.    The patient was placed on the monitor and noted to be having contractions as   often as 3 to 5 minutes apart and she was noted at that time also to be having   subtle decelerations.  I was called by the nurse at approximately 0400 hours and   we requested that the patient be prepared immediately for a  section.    The patient had previously signed consents in the office to have a tubal   ligation.  We had her fill out [facility] consent form that indicated the risks for    section and tubal ligation and also, the consent to receive blood if   necessary.  The patient was  subsequently taken to the Operating Room where   spinal anesthesia was administered.  An adequate level of spinal anesthesia   having been achieved, a timeout was held and the patient was identified by name,   date of birth and procedures to be performed, i.e.,  section and tubal   ligation.  All in the room having agreed to the procedure, a sharp knife was   used to make a Pfannenstiel incision 2 fingerbreadths above the pubic symphysis   and this was taken down sharply to the fascia, which was run laterally.  It   should be noted that the patient did not have any fascia in the midline and   consequently, the fascial incision extended through the peritoneum.  The   peritoneal incision was extended cephalad and caudad using Metzenbaum scissors.    A bladder blade was subsequently emplaced and a sharp knife was used to make an   incision in the lower uterine segment, which had thinned out significantly.    The membranes were nicked showing clear vernix-filled fluid.  The incision was   extended using finger fracture technique and the female infant was delivered   from the vertex presentation.  The nares and mouth suctioned, the rest of the   body delivered, and the cord double clamped and cut, after transfusing the baby,   2 squirts of blood from the placenta.  This having been accomplished, the placenta   was delivered manually, the uterus was exteriorized and cleansed with moist   laps.  The uterus was subsequently closed in layers using 0 Monocryl.  Good   hemostasis having been achieved, we again confirmed with the patient and her sex   partner that she wanted to have a tubal ligation.  Consequently, bilateral   tubal ligation was performed by a procedure more commonly known as the [place]   procedure, where a knuckle of tube approximately 2 cm was excised and the edges   tied off using 2-0 plain providing a separation of 5 to 6 cm.  We noted at that   time that the patient appeared to have had a left  partial salpingectomy, but in   an abundance of caution, we removed additional tube on the left side.  The   uterus was subsequently returned to the abdominal cavity.  The pelvic gutters   cleansed with moist laps and after ensuring that all sponge, lap and instrument   counts were correct, closure of the abdomen was accomplished by using 2-0   chromic for the parietal peritoneum and the muscles, 0 Vicryl for the fascia,   2-0 Vicryl for the subcutaneous and Insorb skin staples for the skin.  The   patient also received an Aquacel dressing and was sent to the postpartal area   with a request for Mefoxin 2 g q. 6 hours x3.  The patient tolerated these   procedures well.  There were no intraoperative complications.  The patient was   subsequently transferred to Recovery Room in good condition.      LUAN  dd: 08/08/2017 13:54:16 (CDT)  td: 08/08/2017 15:53:13 (CDT)  Doc ID   #[id number]  Job ID #[id number]    CC:

## 2017-08-08 NOTE — PROGRESS NOTES
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 Declines Tdap vaccine.

## 2017-08-08 NOTE — PROGRESS NOTES
Delivery Progress Note  Obstetrics        SUBJECTIVE:     Postpartum Day 2:  Delivery    Ms. Ortega states she feels well. She denies emotional concerns. Her pain is well controlled with current medications. The baby is well. The baby is feeding via bottle - Enfamil with Iron. The patient is ambulating well. Ms. Ortega is tolerating a normal diet. Flatus has been passed. Urinary output is adequate.    OBJECTIVE:     Vital Signs (Most Recent):  Temp: 97.8 °F (36.6 °C) (17 1123)  Pulse: 77 (17 1123)  Resp: 18 (17 1123)  BP: 112/72 (17 1123)  SpO2: 98 % (17 1500)    Vital Signs Range (Last 24H):  Temp:  [96.2 °F (35.7 °C)-98.3 °F (36.8 °C)]   Pulse:  [77-97]   Resp:  [18]   BP: (112-119)/(70-76)     I & O (Last 24H):  Intake/Output Summary (Last 24 hours) at 17 1320  Last data filed at 17 0600   Gross per 24 hour   Intake             1840 ml   Output              950 ml   Net              890 ml     Physical Exam:  General:    no distress   Lungs:  clear to auscultation bilaterally   Heart:  regular rate and rhythm, S1, S2 normal, no murmur, click, rub or gallop   Breasts:  no discharge, erythema, or tenderness   Abdomen:  soft, non-tender; bowel sounds normal   Fundus:  firm   Incision:  healing well, no significant drainage   Lochia:   scant rubra   DVT Evaluation:  No evidence of DVT on either side seen on physical exam.     Hemoglobin/Hematocrit    Recent Labs  Lab 17  0625   HGB 7.4*   HCT 22.7*     ABO/Rh  Lab Results   Component Value Date    GROUPTRH O POS 2017     Rubella  No results for input(s): RUBELLAIGGSC in the last 168 hours.    ASSESSMENT/PLAN:     Status post  section. Doing well postoperatively.  Postoperative course complicated by nothing     Discharge home with standard precautions and return to clinic in 2 weeks.

## 2017-08-08 NOTE — LACTATION NOTE
"This note was copied from a baby's chart.     08/08/17 1030   Maternal Infant Assessment   Breast Density Bilateral:;soft   Infant Assessment   Sucking Reflex present   Rooting Reflex present   Swallow Reflex present   LATCH Score   Latch 2-->grasps breast, tongue down, lips flanged, rhythmic sucking   Audible Swallowing 2-->spontaneous and intermittent (24 hrs old)   Type Of Nipple 2-->everted (after stimulation)   Comfort (Breast/Nipple) 2-->soft/nontender   Hold (Positioning) 2-->no assist from staff, mother able to position/hold infant   Score (less than 7 for 2/more consecutive times, consult Lactation Consultant) 10   Maternal Infant Feeding   Maternal Emotional State independent   Time Spent (min) 0-15 min   Infant First Feeding   Breastfeeding Left Side (min) 15 Min   Breastfeeding Right Side (min) 15 Min   Feeding Infant   Effective Latch During Feeding yes   Lactation Referrals   Lactation Consult Breastfeeding assessment;Follow up;Knowledge deficit   Lactation Interventions   Maternal Breastfeeding Support encouragement offered;lactation counseling provided   Reports berastfeeding well without complications.  Encouraged breastfeeding on demand, 8 -12 times in 24 hours.  Call for assist prn.  Requests to offer bottles of formula prn.  Discussed risks associated with formula feeding on the course of breastfeeding.  States "understand" and verbalized appropriate recall.  "

## 2017-08-10 ENCOUNTER — TELEPHONE (OUTPATIENT)
Dept: OBSTETRICS AND GYNECOLOGY | Facility: HOSPITAL | Age: 37
End: 2017-08-10

## 2017-08-10 NOTE — TELEPHONE ENCOUNTER
Spoke to pt who states baby breastfeeding is going well -baby has been exclusively breastfeeding without formula and breasts are full now -baby still does not always empty breast well so she plans to get a pump today -reviewed storage guidelines for milk and referred to breastfeeding guide -baby having about 5-6 wet and dirty diapers in last 24 hours and mother has no other questions or concerns

## 2018-07-12 ENCOUNTER — CLINICAL SUPPORT (OUTPATIENT)
Dept: OCCUPATIONAL MEDICINE | Facility: CLINIC | Age: 38
End: 2018-07-12

## 2018-07-12 DIAGNOSIS — Z11.1 PPD SCREENING TEST: Primary | ICD-10-CM

## 2018-07-12 DIAGNOSIS — Z02.83 EMPLOYMENT-RELATED DRUG TESTING, ENCOUNTER FOR: ICD-10-CM

## 2018-07-12 LAB
CTP QC/QA: YES
POC 5 PANEL DRUG SCREEN: NEGATIVE

## 2018-07-12 PROCEDURE — 86580 TB INTRADERMAL TEST: CPT | Mod: S$GLB,,, | Performed by: EMERGENCY MEDICINE

## 2018-07-12 PROCEDURE — 80305 DRUG TEST PRSMV DIR OPT OBS: CPT | Mod: QW,S$GLB,, | Performed by: NURSE PRACTITIONER

## 2019-05-06 ENCOUNTER — HOSPITAL ENCOUNTER (EMERGENCY)
Facility: OTHER | Age: 39
Discharge: HOME | End: 2019-05-06
Attending: EMERGENCY MEDICINE
Payer: MEDICAID

## 2019-05-06 VITALS
BODY MASS INDEX: 32.02 KG/M2 | TEMPERATURE: 99 F | SYSTOLIC BLOOD PRESSURE: 113 MMHG | WEIGHT: 163.13 LBS | RESPIRATION RATE: 18 BRPM | HEART RATE: 92 BPM | OXYGEN SATURATION: 99 % | HEIGHT: 60 IN | DIASTOLIC BLOOD PRESSURE: 74 MMHG

## 2019-05-06 DIAGNOSIS — G56.03 BILATERAL CARPAL TUNNEL SYNDROME: Primary | ICD-10-CM

## 2019-05-06 LAB
B-HCG UR QL: NEGATIVE
CTP QC/QA: YES

## 2019-05-06 PROCEDURE — 81025 URINE PREGNANCY TEST: CPT | Performed by: EMERGENCY MEDICINE

## 2019-05-06 PROCEDURE — 99284 EMERGENCY DEPT VISIT MOD MDM: CPT

## 2019-05-06 RX ORDER — IBUPROFEN 800 MG/1
800 TABLET ORAL EVERY 6 HOURS PRN
Qty: 30 TABLET | Refills: 0 | Status: SHIPPED | OUTPATIENT
Start: 2019-05-06 | End: 2020-02-23 | Stop reason: SDUPTHER

## 2019-05-06 RX ORDER — TRAMADOL HYDROCHLORIDE 50 MG/1
50 TABLET ORAL EVERY 6 HOURS PRN
Qty: 10 TABLET | Refills: 0 | Status: SHIPPED | OUTPATIENT
Start: 2019-05-06 | End: 2019-05-16

## 2019-05-06 NOTE — ED PROVIDER NOTES
Encounter Date: 2019    SCRIBE #1 NOTE: Miriam MCKAY, am scribing for, and in the presence of, Dr. Castañeda.       History     Chief Complaint   Patient presents with    Carpal Tunnel     Pt reports carpal tunnel flare up with pain and swelling to hands, fingers and wrists for the past three days.      Time seen by provider: 9:54 AM    This is a 38 y.o. female with hx of Carpal tunnel syndrome who presents with complaint of bilateral hand pain for several years worsening this morning. The 10/10 pain worsened after sleeping with bilateral splints last night. She had to wait 30 minutes for pain to subside before she was able to start any daily tasks today. She reports intermittent numbness. She denies improvement with gabapentin. She was seen at [facility] a few weeks ago, given a shot of Toradol with no relief, and told to follow up with her PCP. She has an appointment scheduled with her PCP at [facility]. She has not followed up with a specialist. She denies use of tobacco, alcohol, or illicit drugs.    The history is provided by the patient.     Review of patient's allergies indicates:  No Known Allergies  Past Medical History:   Diagnosis Date    Anemia     Carpal tunnel syndrome      Past Surgical History:   Procedure Laterality Date    BONY PELVIS SURGERY       SECTION, CLASSIC      DELIVERY- SECTION N/A 2017    Performed by [facility] MD at [facility] L[facility]    HIP SURGERY      TUBAL LIGATION       Family History   Problem Relation Age of Onset    Asthma Neg Hx      Social History     Tobacco Use    Smoking status: Former Smoker     Last attempt to quit: 2015     Years since quittin.3    Smokeless tobacco: Never Used   Substance Use Topics    Alcohol use: No    Drug use: Yes     Types: Hydrocodone, Marijuana, Oxycodone     Review of Systems   Constitutional: Negative for fever.   HENT: Negative for sore throat.    Respiratory: Negative for shortness  of breath.    Cardiovascular: Negative for chest pain.   Gastrointestinal: Negative for nausea.   Genitourinary: Negative for dysuria.   Musculoskeletal: Negative for back pain.        Positive for hand pain.   Skin: Negative for rash.   Neurological: Positive for numbness. Negative for weakness.   Hematological: Does not bruise/bleed easily.       Physical Exam     Initial Vitals [05/06/19 0920]   BP Pulse Resp Temp SpO2   113/74 92 18 98.7 °F (37.1 °C) 99 %      MAP       --         Physical Exam    Nursing note and vitals reviewed.  Constitutional: She appears well-developed and well-nourished. She is not diaphoretic. No distress.   HENT:   Head: Normocephalic and atraumatic.   Neck: Normal range of motion. Neck supple.   Cardiovascular:   Pulses:       Radial pulses are 2+ on the right side, and 2+ on the left side.   Pulmonary/Chest: No respiratory distress.   Musculoskeletal:   Bilateral Upper Extremities: No soft tissue swelling of wrist or hand. Nonspecific tenderness around both wrists, primarily volar but no focal bony tenderness. Patient will not allow tinel's or Phalen's testing.   Neurological: She is alert and oriented to person, place, and time.   Bilateral Upper Extremities: Intact sensation throughout. Strength limited by patient effort and compliance, but no focal weakness of flexors or extensors.   Skin: Skin is warm and dry. Capillary refill takes less than 2 seconds.   Bilateral Upper Extremities: No erythema of wrist or hand.         ED Course   Procedures  Labs Reviewed   POCT URINE PREGNANCY          Imaging Results    None          Medical Decision Making:   Clinical Tests:   Lab Tests: Ordered and Reviewed            Scribe Attestation:   Scribe #1: I performed the above scribed service and the documentation accurately describes the services I performed. I attest to the accuracy of the note.    Attending Attestation:           Physician Attestation for Scribe:  Physician Attestation  Statement for Scribe #1: I, Dr. Castañeda, reviewed documentation, as scribed by Miriam Carcamo in my presence, and it is both accurate and complete.          Patient presents with longstanding bilateral wrist and hand pain. Previously diagnosed with carpal tunnel syndrome.  She reports no relief with her gabapentin and over-the-counter ibuprofen.  I offered her a Toradol shot but she declined stating that these do not work she apparently was seen at [facility] or [place] a few weeks ago for similar symptoms reports she has not been referred to nor seen orthopedic/hand specialist.  On my exam she is neurovascular intact. No focal bony tenderness. No neurovascular compromise.  No indication for imaging or emergent workup.  Treat with anti-inflammatory/analgesics but encouraged follow-up with orthopedics as if her symptoms continue to bother her she may wish to discuss injections surgical intervention or other treatment           Clinical Impression:     1. Bilateral carpal tunnel syndrome                                 Matt Castañeda II, MD  05/06/19 5638

## 2020-02-23 ENCOUNTER — NURSE TRIAGE (OUTPATIENT)
Dept: ADMINISTRATIVE | Facility: CLINIC | Age: 40
End: 2020-02-23

## 2020-02-23 ENCOUNTER — HOSPITAL ENCOUNTER (EMERGENCY)
Facility: OTHER | Age: 40
Discharge: HOME | End: 2020-02-23
Attending: EMERGENCY MEDICINE
Payer: MEDICAID

## 2020-02-23 VITALS
SYSTOLIC BLOOD PRESSURE: 117 MMHG | DIASTOLIC BLOOD PRESSURE: 65 MMHG | HEIGHT: 60 IN | OXYGEN SATURATION: 99 % | TEMPERATURE: 99 F | WEIGHT: 143.5 LBS | BODY MASS INDEX: 28.17 KG/M2 | RESPIRATION RATE: 18 BRPM | HEART RATE: 67 BPM

## 2020-02-23 DIAGNOSIS — K43.9 VENTRAL HERNIA WITHOUT OBSTRUCTION OR GANGRENE: Primary | ICD-10-CM

## 2020-02-23 LAB
ALBUMIN SERPL BCP-MCNC: 4.4 G/DL (ref 3.5–5.2)
ALP SERPL-CCNC: 81 U/L (ref 55–135)
ALT SERPL W/O P-5'-P-CCNC: 24 U/L (ref 10–44)
ANION GAP SERPL CALC-SCNC: 9 MMOL/L (ref 8–16)
ANISOCYTOSIS BLD QL SMEAR: SLIGHT
AST SERPL-CCNC: 47 U/L (ref 10–40)
BASOPHILS # BLD AUTO: ABNORMAL K/UL (ref 0–0.2)
BASOPHILS NFR BLD: 1 % (ref 0–1.9)
BILIRUB SERPL-MCNC: 0.2 MG/DL (ref 0.1–1)
BUN SERPL-MCNC: 9 MG/DL (ref 6–20)
CALCIUM SERPL-MCNC: 9.4 MG/DL (ref 8.7–10.5)
CHLORIDE SERPL-SCNC: 104 MMOL/L (ref 95–110)
CO2 SERPL-SCNC: 24 MMOL/L (ref 23–29)
CREAT SERPL-MCNC: 0.8 MG/DL (ref 0.5–1.4)
DACRYOCYTES BLD QL SMEAR: ABNORMAL
DIFFERENTIAL METHOD: ABNORMAL
EOSINOPHIL # BLD AUTO: ABNORMAL K/UL (ref 0–0.5)
EOSINOPHIL NFR BLD: 4 % (ref 0–8)
ERYTHROCYTE [DISTWIDTH] IN BLOOD BY AUTOMATED COUNT: 21.5 % (ref 11.5–14.5)
EST. GFR  (AFRICAN AMERICAN): >60 ML/MIN/1.73 M^2
EST. GFR  (NON AFRICAN AMERICAN): >60 ML/MIN/1.73 M^2
GLUCOSE SERPL-MCNC: 100 MG/DL (ref 70–110)
HCT VFR BLD AUTO: 32.7 % (ref 37–48.5)
HGB BLD-MCNC: 10 G/DL (ref 12–16)
HYPOCHROMIA BLD QL SMEAR: ABNORMAL
IMM GRANULOCYTES # BLD AUTO: ABNORMAL K/UL (ref 0–0.04)
IMM GRANULOCYTES NFR BLD AUTO: ABNORMAL % (ref 0–0.5)
LYMPHOCYTES # BLD AUTO: ABNORMAL K/UL (ref 1–4.8)
LYMPHOCYTES NFR BLD: 30 % (ref 18–48)
MCH RBC QN AUTO: 20.9 PG (ref 27–31)
MCHC RBC AUTO-ENTMCNC: 30.6 G/DL (ref 32–36)
MCV RBC AUTO: 68 FL (ref 82–98)
MONOCYTES # BLD AUTO: ABNORMAL K/UL (ref 0.3–1)
MONOCYTES NFR BLD: 6 % (ref 4–15)
NEUTROPHILS NFR BLD: 59 % (ref 38–73)
NRBC BLD-RTO: 0 /100 WBC
PLATELET # BLD AUTO: 327 K/UL (ref 150–350)
PMV BLD AUTO: 10 FL (ref 9.2–12.9)
POIKILOCYTOSIS BLD QL SMEAR: SLIGHT
POLYCHROMASIA BLD QL SMEAR: ABNORMAL
POTASSIUM SERPL-SCNC: 4.5 MMOL/L (ref 3.5–5.1)
PROT SERPL-MCNC: 8.8 G/DL (ref 6–8.4)
RBC # BLD AUTO: 4.78 M/UL (ref 4–5.4)
SODIUM SERPL-SCNC: 137 MMOL/L (ref 136–145)
TARGETS BLD QL SMEAR: ABNORMAL
WBC # BLD AUTO: 10.72 K/UL (ref 3.9–12.7)

## 2020-02-23 PROCEDURE — 85027 COMPLETE CBC AUTOMATED: CPT

## 2020-02-23 PROCEDURE — 63600175 PHARM REV CODE 636 W HCPCS: Performed by: EMERGENCY MEDICINE

## 2020-02-23 PROCEDURE — 96374 THER/PROPH/DIAG INJ IV PUSH: CPT

## 2020-02-23 PROCEDURE — 85007 BL SMEAR W/DIFF WBC COUNT: CPT

## 2020-02-23 PROCEDURE — 99285 EMERGENCY DEPT VISIT HI MDM: CPT | Mod: 25

## 2020-02-23 PROCEDURE — 80053 COMPREHEN METABOLIC PANEL: CPT

## 2020-02-23 PROCEDURE — 25500020 PHARM REV CODE 255: Performed by: EMERGENCY MEDICINE

## 2020-02-23 RX ORDER — FENTANYL CITRATE 50 UG/ML
50 INJECTION, SOLUTION INTRAMUSCULAR; INTRAVENOUS
Status: COMPLETED | OUTPATIENT
Start: 2020-02-23 | End: 2020-02-23

## 2020-02-23 RX ORDER — IBUPROFEN 800 MG/1
800 TABLET ORAL EVERY 8 HOURS PRN
Qty: 30 TABLET | Refills: 0 | Status: SHIPPED | OUTPATIENT
Start: 2020-02-23

## 2020-02-23 RX ADMIN — IOHEXOL 75 ML: 350 INJECTION, SOLUTION INTRAVENOUS at 02:02

## 2020-02-23 RX ADMIN — FENTANYL CITRATE 50 MCG: 50 INJECTION, SOLUTION INTRAMUSCULAR; INTRAVENOUS at 01:02

## 2020-02-23 NOTE — ED TRIAGE NOTES
"Pt presents to ED with c/o constant abdominal pain x a few days. Reports a bulge to her upper abdomen that is tender to palpation. Reports having a hx of hernias and says it has "been popping in and out for a few years. It popped out a few days ago but it just keeps getting bigger and hurting more". Denies fever, N/V/D, dysuria, and SOB  "

## 2020-02-23 NOTE — ED PROVIDER NOTES
"Encounter Date: 2020    SCRIBE #1 NOTE: I, Carlos Colorado, am scribing for, and in the presence of, Dr. Cano.       History     Chief Complaint   Patient presents with    Abdominal Pain     pt reports abdominal pain, repotrs history of hernia, bulge noted to upper abdominal area, pt denies N/V/D     Time seen by provider: 12:34 AM     This is a 39 y.o. female who presents with complaint of abdominal pain today. Pt has a hx of hernia, which was last pushed back in two days ago. She reports her hernia is "hardening and worsening." She has had the hernia since having her second child two years ago. She denies nausea and vomiting. She has not had a hernia repair or removal. She has no known drug allergies.      The history is provided by the patient.     Review of patient's allergies indicates:  No Known Allergies  Past Medical History:   Diagnosis Date    Anemia     Carpal tunnel syndrome      Past Surgical History:   Procedure Laterality Date    BONY PELVIS SURGERY       SECTION, CLASSIC      HIP SURGERY      TUBAL LIGATION       Family History   Problem Relation Age of Onset    Asthma Neg Hx      Social History     Tobacco Use    Smoking status: Former Smoker     Last attempt to quit: 2015     Years since quittin.1    Smokeless tobacco: Never Used   Substance Use Topics    Alcohol use: No    Drug use: Yes     Types: Hydrocodone, Marijuana, Oxycodone     Review of Systems   Constitutional: Negative for fever.   HENT: Negative for sore throat.    Eyes: Negative for visual disturbance.   Respiratory: Negative for wheezing.    Cardiovascular: Negative for chest pain.   Gastrointestinal: Positive for abdominal pain. Negative for nausea and vomiting.   Genitourinary: Negative for dysuria.   Musculoskeletal: Negative for back pain.   Skin: Negative for rash.   Neurological: Negative for weakness.   Psychiatric/Behavioral: Negative for confusion.       Physical Exam     Initial Vitals " [02/23/20 0016]   BP Pulse Resp Temp SpO2   (!) 154/72 (!) 118 18 98.5 °F (36.9 °C) 97 %      MAP       --         Physical Exam    Constitutional: She appears well-developed and well-nourished. She is not diaphoretic. No distress.   HENT:   Head: Normocephalic and atraumatic.   Eyes: EOM are normal. Pupils are equal, round, and reactive to light.   Neck: Normal range of motion. Neck supple.   Cardiovascular: Regular rhythm and normal heart sounds. Exam reveals no gallop and no friction rub.    No murmur heard.  Tachycardic.   Pulmonary/Chest: Breath sounds normal. No respiratory distress. She has no wheezes. She has no rhonchi. She has no rales.   Abdominal: She exhibits mass (Palpable, at epigastric region.). There is tenderness (to palpation at epigastric region.).   Musculoskeletal: Normal range of motion. She exhibits no edema or tenderness.   Neurological: She is alert and oriented to person, place, and time.   Skin: Skin is warm and dry.         ED Course   Procedures  Labs Reviewed   CBC W/ AUTO DIFFERENTIAL - Abnormal; Notable for the following components:       Result Value    Hemoglobin 10.0 (*)     Hematocrit 32.7 (*)     Mean Corpuscular Volume 68 (*)     Mean Corpuscular Hemoglobin 20.9 (*)     Mean Corpuscular Hemoglobin Conc 30.6 (*)     RDW 21.5 (*)     All other components within normal limits   COMPREHENSIVE METABOLIC PANEL - Abnormal; Notable for the following components:    Total Protein 8.8 (*)     AST 47 (*)     All other components within normal limits          Imaging Results          CT Abdomen Pelvis With Contrast (Final result)  Result time 02/23/20 02:32:40    Final result by Akash Ocnonor MD (02/23/20 02:32:40)                 Impression:      Small midline anterior abdominal wall hernia noted, there is no bowel involvement at this time, there is however appearance thought to represent omental involvement.    There are some mildly prominent small bowel loops, the pattern is  nonspecific and may relate to ileus or enteritis, if there is persistent or worsening symptomatology additional follow-up to exclude obstruction is recommended, it is possible that the aforementioned hernia has intermittent bowel involvement however there is no bowel extension through the hernia at this time.    Suspected ovarian cysts measuring 1.6 cm on the right and 1.5 cm on the left.      Electronically signed by: Akash Oconnor  Date:    02/23/2020  Time:    02:32             Narrative:    EXAMINATION:  CT ABDOMEN PELVIS WITH CONTRAST    CLINICAL HISTORY:  Hernia, complicated;    TECHNIQUE:  Low dose axial images, sagittal and coronal reformations were obtained from the lung bases to the pubic symphysis following the IV administration of 75 mL of Omnipaque 350 .  Oral contrast was not given.    COMPARISON:  None.    FINDINGS:  Single-phase CT examination of the abdomen and pelvis was performed.  The visualized lung bases demonstrate mild motion artifact, diminished depth of inspiration and atelectatic appearing change.  The stomach demonstrates nonspecific appearance of mild fluid and air and ingested material within the gastric lumen.  There is no evidence for acute process of the liver, gallbladder, pancreas, spleen, or adrenal glands.  Hypodense structure at the upper pole of the left kidney could relate to a mildly prominent calyx or a parapelvic cyst.  There is no evidence for hydronephrosis or obstructive uropathy or perinephric inflammatory change bilaterally.  The abdominal aorta appears normal in caliber, demonstrates appropriate opacification.  The urinary bladder appears unremarkable for degree of distention.  Mild heterogeneity of the uterus may relate to timing of imaging after contrast administration.  There is a suspected 1.6 cm right ovarian cyst, and suspected 1.5 cm left ovarian cyst.  There is no dominant pelvic mass or cystic collection.    There is a midline supraumbilical anterior  abdominal wall hernia as best seen on axial image 51 there is no bowel involvement however there is appearance that may relate to omental involvement.  The neck of this hernia measures approximately 16.5 mm transverse dimension.    There are a few mildly dilated small bowel loops noted, the pattern is nonspecific and may relate to ileus or enteritis, the possibility of intermittent bowel involvement with respect to the associated hernia is to be considered however there is no small-bowel extension through the hernia at this time.  The appendix is identified and does not appear inflamed.  Mild to moderate prominence of the colon with air and stool is noted without inflammatory or obstructive pattern.  There is no evidence for free intraperitoneal air.  The osseous structures demonstrate mild chronic change, postoperative change of the left pelvis with associated hardware is noted.  Multiple small focal areas of density within the subcutaneous fat planes of the flank and buttock bilaterally are noted, this may relate to granulomas.                                 Medical Decision Making:   History:   Old Medical Records: I decided to obtain old medical records.  Initial Assessment:   39-year-old female with a history of a ventral hernia presents with complaint of epigastric abdominal pain and inability to reduce her hernia over the past 2 days.  On exam she did have a palpable mass in epigastric region which was easily reduced once she was laid flat and given pain medication.  Clinical Tests:   Lab Tests: Ordered and Reviewed  ED Management:  Labs showed no significant abnormalities.  CT of the abdomen and pelvis with contrast shows a ventral hernia that may contain omentum but no bowel.  There is also dilated loops of bowel concerning for possible ileus or enteritis.  The patient's abdomen is otherwise nondistended and nontender and she has no associated nausea or vomiting. Upon reassessment after her CT the patient  was sleeping comfortably and required constant stimulation to wake her up.  She denies any complaints including pain. Patient will be discharged home in stable condition with information to follow up with General surgery for repair of her hernia.  She was also given precautions for seeking immediate re-evaluation the emergency department.            Scribe Attestation:   Scribe #1: I performed the above scribed service and the documentation accurately describes the services I performed. I attest to the accuracy of the note.    Attending Attestation:           Physician Attestation for Scribe:  Physician Attestation Statement for Scribe #1: I, Dr. Cano, reviewed documentation, as scribed by Carlos Colorado in my presence, and it is both accurate and complete.                 ED Course as of Feb 23 0354   Sun Feb 23, 2020   0122 Mass easily reduced with minimal pressure.    [MN]      ED Course User Index  [MN] Carlos Colorado                Clinical Impression:     1. Ventral hernia without obstruction or gangrene                                Chiquis Cano MD  02/23/20 0355

## 2020-02-23 NOTE — TELEPHONE ENCOUNTER
Reason for Disposition   Caller has medication question, adult has minor symptoms, caller declines triage, and triager answers question    Protocols used: MEDICATION QUESTION CALL-A-    Patient called to inquire about the percocet Rx. Explained to her that the only med ordered in the ED was ibuprofen 800 mg PO.

## 2020-12-29 ENCOUNTER — LAB VISIT (OUTPATIENT)
Dept: LAB | Facility: OTHER | Age: 40
End: 2020-12-29
Payer: MEDICAID

## 2020-12-29 DIAGNOSIS — Z03.818 ENCOUNTER FOR OBSERVATION FOR SUSPECTED EXPOSURE TO OTHER BIOLOGICAL AGENTS RULED OUT: ICD-10-CM

## 2020-12-29 PROCEDURE — U0003 INFECTIOUS AGENT DETECTION BY NUCLEIC ACID (DNA OR RNA); SEVERE ACUTE RESPIRATORY SYNDROME CORONAVIRUS 2 (SARS-COV-2) (CORONAVIRUS DISEASE [COVID-19]), AMPLIFIED PROBE TECHNIQUE, MAKING USE OF HIGH THROUGHPUT TECHNOLOGIES AS DESCRIBED BY CMS-2020-01-R: HCPCS

## 2020-12-30 LAB — SARS-COV-2 RNA RESP QL NAA+PROBE: NOT DETECTED

## 2021-01-12 ENCOUNTER — LAB VISIT (OUTPATIENT)
Dept: LAB | Facility: OTHER | Age: 41
End: 2021-01-12
Payer: MEDICAID

## 2021-01-12 DIAGNOSIS — Z20.822 ENCOUNTER FOR LABORATORY TESTING FOR COVID-19 VIRUS: ICD-10-CM

## 2021-01-12 PROCEDURE — U0003 INFECTIOUS AGENT DETECTION BY NUCLEIC ACID (DNA OR RNA); SEVERE ACUTE RESPIRATORY SYNDROME CORONAVIRUS 2 (SARS-COV-2) (CORONAVIRUS DISEASE [COVID-19]), AMPLIFIED PROBE TECHNIQUE, MAKING USE OF HIGH THROUGHPUT TECHNOLOGIES AS DESCRIBED BY CMS-2020-01-R: HCPCS

## 2021-01-14 LAB — SARS-COV-2 RNA RESP QL NAA+PROBE: NOT DETECTED

## 2021-01-15 ENCOUNTER — LAB VISIT (OUTPATIENT)
Dept: LAB | Facility: OTHER | Age: 41
End: 2021-01-15
Payer: MEDICAID

## 2021-01-15 DIAGNOSIS — Z20.822 ENCOUNTER FOR LABORATORY TESTING FOR COVID-19 VIRUS: ICD-10-CM

## 2021-01-15 PROCEDURE — U0003 INFECTIOUS AGENT DETECTION BY NUCLEIC ACID (DNA OR RNA); SEVERE ACUTE RESPIRATORY SYNDROME CORONAVIRUS 2 (SARS-COV-2) (CORONAVIRUS DISEASE [COVID-19]), AMPLIFIED PROBE TECHNIQUE, MAKING USE OF HIGH THROUGHPUT TECHNOLOGIES AS DESCRIBED BY CMS-2020-01-R: HCPCS

## 2021-01-16 LAB — SARS-COV-2 RNA RESP QL NAA+PROBE: NOT DETECTED

## 2021-01-26 ENCOUNTER — LAB VISIT (OUTPATIENT)
Dept: LAB | Facility: OTHER | Age: 41
End: 2021-01-26
Payer: MEDICAID

## 2021-01-26 DIAGNOSIS — Z20.822 ENCOUNTER FOR LABORATORY TESTING FOR COVID-19 VIRUS: ICD-10-CM

## 2021-01-26 PROCEDURE — U0003 INFECTIOUS AGENT DETECTION BY NUCLEIC ACID (DNA OR RNA); SEVERE ACUTE RESPIRATORY SYNDROME CORONAVIRUS 2 (SARS-COV-2) (CORONAVIRUS DISEASE [COVID-19]), AMPLIFIED PROBE TECHNIQUE, MAKING USE OF HIGH THROUGHPUT TECHNOLOGIES AS DESCRIBED BY CMS-2020-01-R: HCPCS

## 2021-01-28 LAB — SARS-COV-2 RNA RESP QL NAA+PROBE: NOT DETECTED

## 2021-02-09 ENCOUNTER — LAB VISIT (OUTPATIENT)
Dept: LAB | Facility: OTHER | Age: 41
End: 2021-02-09
Payer: MEDICAID

## 2021-02-09 DIAGNOSIS — Z20.822 ENCOUNTER FOR LABORATORY TESTING FOR COVID-19 VIRUS: ICD-10-CM

## 2021-02-09 PROCEDURE — U0003 INFECTIOUS AGENT DETECTION BY NUCLEIC ACID (DNA OR RNA); SEVERE ACUTE RESPIRATORY SYNDROME CORONAVIRUS 2 (SARS-COV-2) (CORONAVIRUS DISEASE [COVID-19]), AMPLIFIED PROBE TECHNIQUE, MAKING USE OF HIGH THROUGHPUT TECHNOLOGIES AS DESCRIBED BY CMS-2020-01-R: HCPCS

## 2021-02-11 LAB — SARS-COV-2 RNA RESP QL NAA+PROBE: NOT DETECTED

## 2021-02-15 ENCOUNTER — LAB VISIT (OUTPATIENT)
Dept: LAB | Facility: OTHER | Age: 41
End: 2021-02-15
Attending: INTERNAL MEDICINE
Payer: MEDICAID

## 2021-02-15 DIAGNOSIS — Z20.822 ENCOUNTER FOR LABORATORY TESTING FOR COVID-19 VIRUS: ICD-10-CM

## 2021-02-15 PROCEDURE — U0003 INFECTIOUS AGENT DETECTION BY NUCLEIC ACID (DNA OR RNA); SEVERE ACUTE RESPIRATORY SYNDROME CORONAVIRUS 2 (SARS-COV-2) (CORONAVIRUS DISEASE [COVID-19]), AMPLIFIED PROBE TECHNIQUE, MAKING USE OF HIGH THROUGHPUT TECHNOLOGIES AS DESCRIBED BY CMS-2020-01-R: HCPCS

## 2021-02-17 LAB — SARS-COV-2 RNA RESP QL NAA+PROBE: NOT DETECTED

## 2021-02-23 ENCOUNTER — LAB VISIT (OUTPATIENT)
Dept: LAB | Facility: OTHER | Age: 41
End: 2021-02-23
Payer: MEDICAID

## 2021-02-23 DIAGNOSIS — Z20.822 ENCOUNTER FOR LABORATORY TESTING FOR COVID-19 VIRUS: ICD-10-CM

## 2021-02-23 PROCEDURE — U0003 INFECTIOUS AGENT DETECTION BY NUCLEIC ACID (DNA OR RNA); SEVERE ACUTE RESPIRATORY SYNDROME CORONAVIRUS 2 (SARS-COV-2) (CORONAVIRUS DISEASE [COVID-19]), AMPLIFIED PROBE TECHNIQUE, MAKING USE OF HIGH THROUGHPUT TECHNOLOGIES AS DESCRIBED BY CMS-2020-01-R: HCPCS

## 2021-02-24 LAB — SARS-COV-2 RNA RESP QL NAA+PROBE: NOT DETECTED

## 2021-03-02 ENCOUNTER — LAB VISIT (OUTPATIENT)
Dept: LAB | Facility: OTHER | Age: 41
End: 2021-03-02
Payer: MEDICAID

## 2021-03-02 DIAGNOSIS — Z20.822 ENCOUNTER FOR LABORATORY TESTING FOR COVID-19 VIRUS: ICD-10-CM

## 2021-03-02 PROCEDURE — U0003 INFECTIOUS AGENT DETECTION BY NUCLEIC ACID (DNA OR RNA); SEVERE ACUTE RESPIRATORY SYNDROME CORONAVIRUS 2 (SARS-COV-2) (CORONAVIRUS DISEASE [COVID-19]), AMPLIFIED PROBE TECHNIQUE, MAKING USE OF HIGH THROUGHPUT TECHNOLOGIES AS DESCRIBED BY CMS-2020-01-R: HCPCS

## 2021-03-03 LAB — SARS-COV-2 RNA RESP QL NAA+PROBE: NOT DETECTED

## 2021-03-09 ENCOUNTER — LAB VISIT (OUTPATIENT)
Dept: LAB | Facility: OTHER | Age: 41
End: 2021-03-09
Payer: MEDICAID

## 2021-03-09 DIAGNOSIS — Z20.822 ENCOUNTER FOR LABORATORY TESTING FOR COVID-19 VIRUS: ICD-10-CM

## 2021-03-09 PROCEDURE — U0003 INFECTIOUS AGENT DETECTION BY NUCLEIC ACID (DNA OR RNA); SEVERE ACUTE RESPIRATORY SYNDROME CORONAVIRUS 2 (SARS-COV-2) (CORONAVIRUS DISEASE [COVID-19]), AMPLIFIED PROBE TECHNIQUE, MAKING USE OF HIGH THROUGHPUT TECHNOLOGIES AS DESCRIBED BY CMS-2020-01-R: HCPCS | Performed by: NURSE PRACTITIONER

## 2021-03-11 LAB — SARS-COV-2 RNA RESP QL NAA+PROBE: NOT DETECTED

## 2021-03-23 ENCOUNTER — LAB VISIT (OUTPATIENT)
Dept: LAB | Facility: OTHER | Age: 41
End: 2021-03-23
Payer: MEDICAID

## 2021-03-23 DIAGNOSIS — Z20.822 ENCOUNTER FOR LABORATORY TESTING FOR COVID-19 VIRUS: ICD-10-CM

## 2021-03-23 PROCEDURE — U0003 INFECTIOUS AGENT DETECTION BY NUCLEIC ACID (DNA OR RNA); SEVERE ACUTE RESPIRATORY SYNDROME CORONAVIRUS 2 (SARS-COV-2) (CORONAVIRUS DISEASE [COVID-19]), AMPLIFIED PROBE TECHNIQUE, MAKING USE OF HIGH THROUGHPUT TECHNOLOGIES AS DESCRIBED BY CMS-2020-01-R: HCPCS | Performed by: NURSE PRACTITIONER

## 2021-03-25 LAB — SARS-COV-2 RNA RESP QL NAA+PROBE: NOT DETECTED

## 2021-03-30 ENCOUNTER — LAB VISIT (OUTPATIENT)
Dept: LAB | Facility: OTHER | Age: 41
End: 2021-03-30
Payer: MEDICAID

## 2021-03-30 DIAGNOSIS — Z20.822 ENCOUNTER FOR LABORATORY TESTING FOR COVID-19 VIRUS: ICD-10-CM

## 2021-03-30 PROCEDURE — U0003 INFECTIOUS AGENT DETECTION BY NUCLEIC ACID (DNA OR RNA); SEVERE ACUTE RESPIRATORY SYNDROME CORONAVIRUS 2 (SARS-COV-2) (CORONAVIRUS DISEASE [COVID-19]), AMPLIFIED PROBE TECHNIQUE, MAKING USE OF HIGH THROUGHPUT TECHNOLOGIES AS DESCRIBED BY CMS-2020-01-R: HCPCS | Performed by: NURSE PRACTITIONER

## 2021-03-31 LAB — SARS-COV-2 RNA RESP QL NAA+PROBE: NOT DETECTED

## 2021-04-06 ENCOUNTER — LAB VISIT (OUTPATIENT)
Dept: LAB | Facility: OTHER | Age: 41
End: 2021-04-06
Payer: MEDICAID

## 2021-04-06 DIAGNOSIS — Z20.822 ENCOUNTER FOR LABORATORY TESTING FOR COVID-19 VIRUS: ICD-10-CM

## 2021-04-06 PROCEDURE — U0003 INFECTIOUS AGENT DETECTION BY NUCLEIC ACID (DNA OR RNA); SEVERE ACUTE RESPIRATORY SYNDROME CORONAVIRUS 2 (SARS-COV-2) (CORONAVIRUS DISEASE [COVID-19]), AMPLIFIED PROBE TECHNIQUE, MAKING USE OF HIGH THROUGHPUT TECHNOLOGIES AS DESCRIBED BY CMS-2020-01-R: HCPCS | Performed by: NURSE PRACTITIONER

## 2021-04-07 LAB — SARS-COV-2 RNA RESP QL NAA+PROBE: NOT DETECTED

## 2021-04-13 ENCOUNTER — LAB VISIT (OUTPATIENT)
Dept: LAB | Facility: OTHER | Age: 41
End: 2021-04-13
Payer: MEDICAID

## 2021-04-13 DIAGNOSIS — Z20.822 ENCOUNTER FOR LABORATORY TESTING FOR COVID-19 VIRUS: ICD-10-CM

## 2021-04-13 PROCEDURE — U0003 INFECTIOUS AGENT DETECTION BY NUCLEIC ACID (DNA OR RNA); SEVERE ACUTE RESPIRATORY SYNDROME CORONAVIRUS 2 (SARS-COV-2) (CORONAVIRUS DISEASE [COVID-19]), AMPLIFIED PROBE TECHNIQUE, MAKING USE OF HIGH THROUGHPUT TECHNOLOGIES AS DESCRIBED BY CMS-2020-01-R: HCPCS | Performed by: NURSE PRACTITIONER

## 2021-04-14 LAB — SARS-COV-2 RNA RESP QL NAA+PROBE: NOT DETECTED

## 2021-04-20 ENCOUNTER — LAB VISIT (OUTPATIENT)
Dept: LAB | Facility: OTHER | Age: 41
End: 2021-04-20
Payer: MEDICAID

## 2021-04-20 DIAGNOSIS — Z20.822 ENCOUNTER FOR LABORATORY TESTING FOR COVID-19 VIRUS: ICD-10-CM

## 2021-04-20 PROCEDURE — U0003 INFECTIOUS AGENT DETECTION BY NUCLEIC ACID (DNA OR RNA); SEVERE ACUTE RESPIRATORY SYNDROME CORONAVIRUS 2 (SARS-COV-2) (CORONAVIRUS DISEASE [COVID-19]), AMPLIFIED PROBE TECHNIQUE, MAKING USE OF HIGH THROUGHPUT TECHNOLOGIES AS DESCRIBED BY CMS-2020-01-R: HCPCS | Performed by: NURSE PRACTITIONER

## 2021-04-21 LAB — SARS-COV-2 RNA RESP QL NAA+PROBE: NOT DETECTED

## 2021-04-26 ENCOUNTER — PATIENT MESSAGE (OUTPATIENT)
Dept: RESEARCH | Facility: HOSPITAL | Age: 41
End: 2021-04-26

## 2021-04-27 ENCOUNTER — LAB VISIT (OUTPATIENT)
Dept: LAB | Facility: OTHER | Age: 41
End: 2021-04-27
Payer: MEDICAID

## 2021-04-27 DIAGNOSIS — Z20.822 ENCOUNTER FOR LABORATORY TESTING FOR COVID-19 VIRUS: ICD-10-CM

## 2021-04-27 PROCEDURE — U0003 INFECTIOUS AGENT DETECTION BY NUCLEIC ACID (DNA OR RNA); SEVERE ACUTE RESPIRATORY SYNDROME CORONAVIRUS 2 (SARS-COV-2) (CORONAVIRUS DISEASE [COVID-19]), AMPLIFIED PROBE TECHNIQUE, MAKING USE OF HIGH THROUGHPUT TECHNOLOGIES AS DESCRIBED BY CMS-2020-01-R: HCPCS | Performed by: NURSE PRACTITIONER

## 2021-04-28 ENCOUNTER — HOSPITAL ENCOUNTER (EMERGENCY)
Facility: OTHER | Age: 41
Discharge: HOME | End: 2021-04-28
Attending: EMERGENCY MEDICINE
Payer: MEDICAID

## 2021-04-28 VITALS
BODY MASS INDEX: 28.47 KG/M2 | DIASTOLIC BLOOD PRESSURE: 71 MMHG | TEMPERATURE: 99 F | OXYGEN SATURATION: 96 % | HEIGHT: 60 IN | WEIGHT: 145 LBS | SYSTOLIC BLOOD PRESSURE: 113 MMHG | RESPIRATION RATE: 16 BRPM | HEART RATE: 99 BPM

## 2021-04-28 DIAGNOSIS — S01.312A LACERATION OF HELIX OF LEFT EAR, INITIAL ENCOUNTER: ICD-10-CM

## 2021-04-28 DIAGNOSIS — S09.90XA CLOSED HEAD INJURY, INITIAL ENCOUNTER: Primary | ICD-10-CM

## 2021-04-28 DIAGNOSIS — M79.642 LEFT HAND PAIN: ICD-10-CM
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
## 2021-04-28 LAB
B-HCG UR QL: NEGATIVE
CTP QC/QA: YES
SARS-COV-2 RNA RESP QL NAA+PROBE: NOT DETECTED

## 2021-04-28 PROCEDURE — 25000003 PHARM REV CODE 250: Performed by: EMERGENCY MEDICINE

## 2021-04-28 PROCEDURE — 99284 EMERGENCY DEPT VISIT MOD MDM: CPT | Mod: 25

## 2021-04-28 PROCEDURE — 81025 URINE PREGNANCY TEST: CPT | Performed by: EMERGENCY MEDICINE

## 2021-04-28 PROCEDURE — 12011 RPR F/E/E/N/L/M 2.5 CM/<: CPT

## 2021-04-28 RX ORDER — HYDROCODONE BITARTRATE AND ACETAMINOPHEN 5; 325 MG/1; MG/1
1 TABLET ORAL
Status: COMPLETED | OUTPATIENT
Start: 2021-04-28 | End: 2021-04-28

## 2021-04-28 RX ORDER — LIDOCAINE HYDROCHLORIDE 10 MG/ML
10 INJECTION INFILTRATION; PERINEURAL
Status: COMPLETED | OUTPATIENT
Start: 2021-04-28 | End: 2021-04-28

## 2021-04-28 RX ORDER — HYDROCODONE BITARTRATE AND ACETAMINOPHEN 5; 325 MG/1; MG/1
1 TABLET ORAL EVERY 8 HOURS PRN
Qty: 9 TABLET | Refills: 0 | Status: SHIPPED | OUTPATIENT
Start: 2021-04-28 | End: 2021-05-01

## 2021-04-28 RX ORDER — BACITRACIN ZINC 500 UNIT/G
1 OINTMENT (GRAM) TOPICAL 2 TIMES DAILY
Status: COMPLETED | OUTPATIENT
Start: 2021-04-28 | End: 2021-04-28

## 2021-04-28 RX ORDER — IBUPROFEN 800 MG/1
800 TABLET ORAL EVERY 8 HOURS PRN
Qty: 20 TABLET | Refills: 0 | Status: SHIPPED | OUTPATIENT
Start: 2021-04-28

## 2021-04-28 RX ORDER — BACITRACIN ZINC 500 UNIT/G
OINTMENT (GRAM) TOPICAL 2 TIMES DAILY
Qty: 14 G | Refills: 0 | Status: SHIPPED | OUTPATIENT
Start: 2021-04-28 | End: 2021-05-05

## 2021-04-28 RX ORDER — IBUPROFEN 400 MG/1
800 TABLET ORAL
Status: COMPLETED | OUTPATIENT
Start: 2021-04-28 | End: 2021-04-28

## 2021-04-28 RX ORDER — BACITRACIN ZINC 500 UNIT/G
1 OINTMENT (GRAM) TOPICAL 2 TIMES DAILY
Status: DISCONTINUED | OUTPATIENT
Start: 2021-04-28 | End: 2021-04-28

## 2021-04-28 RX ADMIN — HYDROCODONE BITARTRATE AND ACETAMINOPHEN 1 TABLET: 5; 325 TABLET ORAL at 12:04

## 2021-04-28 RX ADMIN — LIDOCAINE HYDROCHLORIDE 10 ML: 10 INJECTION, SOLUTION INFILTRATION; PERINEURAL at 01:04

## 2021-04-28 RX ADMIN — IBUPROFEN 800 MG: 400 TABLET, FILM COATED ORAL at 12:04

## 2021-04-28 RX ADMIN — Medication 1 TUBE: at 04:04

## 2021-04-28 RX ADMIN — HYDROCODONE BITARTRATE AND ACETAMINOPHEN 1 TABLET: 5; 325 TABLET ORAL at 02:04

## 2021-05-04 ENCOUNTER — LAB VISIT (OUTPATIENT)
Dept: LAB | Facility: OTHER | Age: 41
End: 2021-05-04
Payer: MEDICAID

## 2021-05-04 DIAGNOSIS — Z20.822 ENCOUNTER FOR LABORATORY TESTING FOR COVID-19 VIRUS: ICD-10-CM

## 2021-05-04 PROCEDURE — U0003 INFECTIOUS AGENT DETECTION BY NUCLEIC ACID (DNA OR RNA); SEVERE ACUTE RESPIRATORY SYNDROME CORONAVIRUS 2 (SARS-COV-2) (CORONAVIRUS DISEASE [COVID-19]), AMPLIFIED PROBE TECHNIQUE, MAKING USE OF HIGH THROUGHPUT TECHNOLOGIES AS DESCRIBED BY CMS-2020-01-R: HCPCS | Performed by: NURSE PRACTITIONER

## 2021-05-05 LAB — SARS-COV-2 RNA RESP QL NAA+PROBE: NOT DETECTED

## 2021-05-11 ENCOUNTER — LAB VISIT (OUTPATIENT)
Dept: LAB | Facility: OTHER | Age: 41
End: 2021-05-11
Payer: MEDICAID

## 2021-05-11 DIAGNOSIS — Z20.822 ENCOUNTER FOR LABORATORY TESTING FOR COVID-19 VIRUS: ICD-10-CM

## 2021-05-11 PROCEDURE — U0003 INFECTIOUS AGENT DETECTION BY NUCLEIC ACID (DNA OR RNA); SEVERE ACUTE RESPIRATORY SYNDROME CORONAVIRUS 2 (SARS-COV-2) (CORONAVIRUS DISEASE [COVID-19]), AMPLIFIED PROBE TECHNIQUE, MAKING USE OF HIGH THROUGHPUT TECHNOLOGIES AS DESCRIBED BY CMS-2020-01-R: HCPCS | Performed by: NURSE PRACTITIONER

## 2021-05-12 LAB — SARS-COV-2 RNA RESP QL NAA+PROBE: NOT DETECTED

## 2021-05-18 ENCOUNTER — LAB VISIT (OUTPATIENT)
Dept: LAB | Facility: OTHER | Age: 41
End: 2021-05-18
Payer: MEDICAID

## 2021-05-18 DIAGNOSIS — Z20.822 ENCOUNTER FOR LABORATORY TESTING FOR COVID-19 VIRUS: ICD-10-CM

## 2021-05-18 PROCEDURE — U0003 INFECTIOUS AGENT DETECTION BY NUCLEIC ACID (DNA OR RNA); SEVERE ACUTE RESPIRATORY SYNDROME CORONAVIRUS 2 (SARS-COV-2) (CORONAVIRUS DISEASE [COVID-19]), AMPLIFIED PROBE TECHNIQUE, MAKING USE OF HIGH THROUGHPUT TECHNOLOGIES AS DESCRIBED BY CMS-2020-01-R: HCPCS | Performed by: NURSE PRACTITIONER

## 2021-05-19 LAB — SARS-COV-2 RNA RESP QL NAA+PROBE: NOT DETECTED

## 2023-10-09 NOTE — PROGRESS NOTES
Delivery Progress Note  Obstetrics        SUBJECTIVE:     Postpartum Day 1:  Delivery    Ms. Ortega states she feels well. She denies emotional concerns. Her pain is well controlled with current medications. The baby is well. The baby is feeding via both breast and bottle - Enfamil with Iron. The patient is ambulating well. Ms. Ortega is tolerating a normal diet. Flatus has been passed. Urinary output is adequate.    OBJECTIVE:     Vital Signs (Most Recent):  Temp: 98 °F (36.7 °C) (17)  Pulse: 88 (17)  Resp: 18 (17)  BP: 119/69 (17)  SpO2: 98 % (17 1500)    Vital Signs Range (Last 24H):  Temp:  [98 °F (36.7 °C)-99.9 °F (37.7 °C)]   Pulse:  []   Resp:  [16-20]   BP: (119-139)/(69-85)     I & O (Last 24H):  Intake/Output Summary (Last 24 hours) at 17 1857  Last data filed at 17 1130   Gross per 24 hour   Intake             1565 ml   Output             5300 ml   Net            -3735 ml     Physical Exam:  General:    no distress   Lungs:  clear to auscultation bilaterally   Heart:  regular rate and rhythm, S1, S2 normal, no murmur, click, rub or gallop   Breasts:  no discharge, erythema, or tenderness   Abdomen:  soft, non-tender; bowel sounds normal   Fundus:  firm   Incision:  healing well   Lochia:   moderate rubra   DVT Evaluation:  No evidence of DVT on either side seen on physical exam.     Hemoglobin/Hematocrit    Recent Labs  Lab 17  0625   HGB 7.4*   HCT 22.7*     ABO/Rh  Lab Results   Component Value Date    GROUPTRH O POS 2017     Rubella  No results for input(s): RUBELLAIGGSC in the last 168 hours.    ASSESSMENT/PLAN:     Status post  section. Doing well postoperatively.     Continue current care.   Addended by: CAREY CARABALLO on: 10/9/2023 10:54 AM     Modules accepted: Orders

## (undated) DEVICE — DRESSING AQUACEL AG ADV 3.5X12